# Patient Record
Sex: FEMALE | Race: ASIAN | NOT HISPANIC OR LATINO | Employment: STUDENT | URBAN - METROPOLITAN AREA
[De-identification: names, ages, dates, MRNs, and addresses within clinical notes are randomized per-mention and may not be internally consistent; named-entity substitution may affect disease eponyms.]

---

## 2019-12-28 ENCOUNTER — APPOINTMENT (OUTPATIENT)
Dept: CT IMAGING | Facility: CLINIC | Age: 29
End: 2019-12-28
Attending: EMERGENCY MEDICINE
Payer: COMMERCIAL

## 2019-12-28 ENCOUNTER — HOSPITAL ENCOUNTER (EMERGENCY)
Facility: CLINIC | Age: 29
Discharge: HOME | End: 2019-12-29
Attending: EMERGENCY MEDICINE
Payer: COMMERCIAL

## 2019-12-28 ENCOUNTER — APPOINTMENT (OUTPATIENT)
Dept: GENERAL RADIOLOGY | Facility: CLINIC | Age: 29
End: 2019-12-28
Attending: EMERGENCY MEDICINE
Payer: COMMERCIAL

## 2019-12-28 ENCOUNTER — HOSPITAL ENCOUNTER (EMERGENCY)
Facility: CLINIC | Age: 29
Discharge: HOME | End: 2019-12-28
Attending: EMERGENCY MEDICINE | Admitting: EMERGENCY MEDICINE
Payer: COMMERCIAL

## 2019-12-28 VITALS
DIASTOLIC BLOOD PRESSURE: 77 MMHG | HEART RATE: 73 BPM | HEIGHT: 64 IN | OXYGEN SATURATION: 100 % | BODY MASS INDEX: 21.34 KG/M2 | SYSTOLIC BLOOD PRESSURE: 121 MMHG | RESPIRATION RATE: 20 BRPM | WEIGHT: 125 LBS | TEMPERATURE: 97.8 F

## 2019-12-28 VITALS
HEIGHT: 64 IN | BODY MASS INDEX: 21.08 KG/M2 | OXYGEN SATURATION: 97 % | WEIGHT: 123.46 LBS | HEART RATE: 84 BPM | TEMPERATURE: 98.4 F | SYSTOLIC BLOOD PRESSURE: 106 MMHG | RESPIRATION RATE: 18 BRPM | DIASTOLIC BLOOD PRESSURE: 41 MMHG

## 2019-12-28 DIAGNOSIS — S72.434A CLOSED NONDISPLACED FRACTURE OF MEDIAL CONDYLE OF RIGHT FEMUR, INITIAL ENCOUNTER (H): Primary | ICD-10-CM

## 2019-12-28 DIAGNOSIS — M25.561 ACUTE PAIN OF RIGHT KNEE: Primary | ICD-10-CM

## 2019-12-28 PROCEDURE — 73700 CT LOWER EXTREMITY W/O DYE: CPT | Mod: RT

## 2019-12-28 PROCEDURE — 25000132 ZZH RX MED GY IP 250 OP 250 PS 637: Performed by: EMERGENCY MEDICINE

## 2019-12-28 PROCEDURE — 73560 X-RAY EXAM OF KNEE 1 OR 2: CPT | Mod: RT

## 2019-12-28 PROCEDURE — 73610 X-RAY EXAM OF ANKLE: CPT | Mod: RT

## 2019-12-28 PROCEDURE — 73590 X-RAY EXAM OF LOWER LEG: CPT | Mod: RT

## 2019-12-28 PROCEDURE — 99285 EMERGENCY DEPT VISIT HI MDM: CPT | Mod: 25

## 2019-12-28 PROCEDURE — 99284 EMERGENCY DEPT VISIT MOD MDM: CPT | Mod: Z6 | Performed by: EMERGENCY MEDICINE

## 2019-12-28 PROCEDURE — 99284 EMERGENCY DEPT VISIT MOD MDM: CPT | Mod: 25,27 | Performed by: EMERGENCY MEDICINE

## 2019-12-28 PROCEDURE — 25000128 H RX IP 250 OP 636: Performed by: EMERGENCY MEDICINE

## 2019-12-28 RX ORDER — HYDROCODONE BITARTRATE AND ACETAMINOPHEN 5; 325 MG/1; MG/1
1 TABLET ORAL EVERY 4 HOURS PRN
Qty: 12 TABLET | Refills: 0 | Status: ON HOLD | OUTPATIENT
Start: 2019-12-28 | End: 2020-01-28

## 2019-12-28 RX ORDER — IBUPROFEN 600 MG/1
600 TABLET, FILM COATED ORAL ONCE
Status: COMPLETED | OUTPATIENT
Start: 2019-12-28 | End: 2019-12-28

## 2019-12-28 RX ORDER — OXYCODONE HYDROCHLORIDE 5 MG/1
5 TABLET ORAL ONCE
Status: COMPLETED | OUTPATIENT
Start: 2019-12-28 | End: 2019-12-28

## 2019-12-28 RX ORDER — ONDANSETRON 4 MG/1
4 TABLET, ORALLY DISINTEGRATING ORAL ONCE
Status: COMPLETED | OUTPATIENT
Start: 2019-12-28 | End: 2019-12-28

## 2019-12-28 RX ADMIN — ONDANSETRON 4 MG: 4 TABLET, ORALLY DISINTEGRATING ORAL at 18:16

## 2019-12-28 RX ADMIN — OXYCODONE HYDROCHLORIDE 5 MG: 5 TABLET ORAL at 18:16

## 2019-12-28 RX ADMIN — IBUPROFEN 600 MG: 600 TABLET ORAL at 17:12

## 2019-12-28 ASSESSMENT — ENCOUNTER SYMPTOMS
ABDOMINAL PAIN: 0
SHORTNESS OF BREATH: 0
FEVER: 0
JOINT SWELLING: 1

## 2019-12-28 ASSESSMENT — MIFFLIN-ST. JEOR
SCORE: 1270
SCORE: 1277

## 2019-12-28 NOTE — ED TRIAGE NOTES
Presents with pain to RLE after falling off an incline while rock climbing within the past hour. Patient denies head injury, LOC. Patient able to move right foot, denies numbness/tingling.

## 2019-12-28 NOTE — ED AVS SNAPSHOT
[facility], [place], Emergency Department  [address] [place] [address]  Phone:  [phone]                                    Kamryn Segundo   MRN: [id number]    Department:  [facility], Emergency Department   Date of Visit:  12/28/2019           After Visit Summary Signature Page    I have received my discharge instructions, and my questions have been answered. I have discussed any challenges I see with this plan with the nurse or doctor.    ..........................................................................................................................................  Patient/Patient Representative Signature      ..........................................................................................................................................  Patient Representative Print Name and Relationship to Patient    ..................................................               ................................................  Date                                   Time    ..........................................................................................................................................  Reviewed by Signature/Title    ...................................................              ..............................................  Date                                               Time          22EPIC Rev 08/18

## 2019-12-28 NOTE — ED AVS SNAPSHOT
[facility], [place], Emergency Department  [address] [place] [address]  Phone:  [phone]                                    Kamryn Segundo   MRN: [id number]    Department:  [facility], Emergency Department   Date of Visit:  12/28/2019           After Visit Summary Signature Page    I have received my discharge instructions, and my questions have been answered. I have discussed any challenges I see with this plan with the nurse or doctor.    ..........................................................................................................................................  Patient/Patient Representative Signature      ..........................................................................................................................................  Patient Representative Print Name and Relationship to Patient    ..................................................               ................................................  Date                                   Time    ..........................................................................................................................................  Reviewed by Signature/Title    ...................................................              ..............................................  Date                                               Time          22EPIC Rev 08/18

## 2019-12-29 NOTE — DISCHARGE INSTRUCTIONS
A referral for orthopedic surgery has been made for you.  They will contact you to set up appointment.  Do not bear weight on your right leg and use crutches at all times    Please make an appointment to follow up with Orthopedics (phone: [phone]) as soon as possible.      Return to the ED if you develop worsening pain, worsening swelling, numbness, tingling, weakness, or any new or worsening concerns.

## 2019-12-29 NOTE — ED PROVIDER NOTES
"  History     Chief Complaint   Patient presents with     Knee Injury     HPI  Kamryn Segundo is a 29 year old female who presents with a chief complaint of knee pain and concern for occult fracture previously missed.  She was previously seen by myself just a few hours ago.  Initial preliminary x-ray read showed no evidence of fracture and I did not visualize 1 myself.  Final read raise the possible concern of a subtle fracture recommending further imaging.  Patient was contacted by myself and recommended either to follow-up with Orth or come in.  She elected to come in for a additional scan.  She notes pain has been well controlled with oxycodone.  She has been wearing the knee immobilizer since discharge.    History reviewed. No pertinent past medical history.    History reviewed. No pertinent surgical history.    No family history on file.    Social History     Tobacco Use     Smoking status: Never Smoker     Smokeless tobacco: Never Used   Substance Use Topics     Alcohol use: Not Currently           I have reviewed the Medications, Allergies, Past Medical and Surgical History, and Social History in the Epic system.    Review of Systems   Constitutional: Negative for fever.   Respiratory: Negative for shortness of breath.    Cardiovascular: Negative for chest pain.   Gastrointestinal: Negative for abdominal pain.   Musculoskeletal: Positive for joint swelling.   All other systems reviewed and are negative.      Physical Exam   BP: 106/41  Pulse: 84  Temp: 98.4  F (36.9  C)  Resp: 18  Height: 162.6 cm (5' 4\")  Weight: 56 kg (123 lb 7.3 oz)  SpO2: 97 %      Physical Exam  Vitals signs and nursing note reviewed.   Constitutional:       General: She is not in acute distress.     Appearance: She is well-developed.   HENT:      Head: Normocephalic.   Neck:      Musculoskeletal: Neck supple.   Pulmonary:      Effort: No respiratory distress.   Abdominal:      General: There is no distension.   Musculoskeletal:         " General: No deformity.      Comments: Right leg knee immobilizer in place, minimal tenderness   Skin:     General: Skin is dry.   Neurological:      Mental Status: She is alert.         ED Course        Procedures         CT Knee Right w/o Contrast   Final Result   IMPRESSION:    Comminuted and impacted fracture of the medial femoral condyle.                 Labs Ordered and Resulted from Time of ED Arrival Up to the Time of Departure from the ED - No data to display         Assessments & Plan (with Medical Decision Making)   29-year-old female presents to us with a chief complaint of knee pain.  CT scan was performed to better characterize extent of the injury.  A femoral medial condyle fracture was seen.  Ortho evaluated the patient and recommended crutches nonweightbearing knee immobilizer and follow-up in clinic.  He stated this appeared to be nonoperative.  There was a tentative plan to get an MRI.  Patient was signed out to overnight provider pending decision on MRI.    I have reviewed the nursing notes.    I have reviewed the findings, diagnosis, plan and need for follow up with the patient.    Discharge Medication List as of 12/29/2019  2:36 AM          Final diagnoses:   Closed nondisplaced fracture of medial condyle of right femur, initial encounter (H)       12/28/2019   [facility], [place], EMERGENCY DEPARTMENT     Alfred Pickett, DO  12/29/19 1749

## 2019-12-29 NOTE — CONSULTS
"[facility]  Orthopedics  2019 1:57 AM     Name: Kamryn Segundo  MRN: [id number]  Age: 29 year old  : 1990  Referring provider: Alfred Pickett*     Chief Complaint:  Right distal femur fracture     Date of Injury:  2019     History of Present Illness:   Kamryn Segundo is a 29 year old female with no significant PMH who presents with a right distal femur fracture after falling from a height of 1 meter at a bouldering gym.  Patient states that she twisted her knee during the fall and directly impacted the edge of a mat.  She experienced immediate pain and was unable to weight-bear.  She called her friend who brought her to the emergency department.  She has no other pain.  She denies numbness, tingling, weakness.  She had no antecedent knee pain and has no history of prior knee injury.  X-ray and CT scan of the right knee demonstrated a right distal femur fracture for which orthopedic surgery was consulted for evaluation and management.     Review of Systems:   A 10-point review of systems was obtained and is negative except for as noted in the HPI.      Medications:   Pain: None  Anticoagulation: None  Antibiotics: None     Allergies:  No Known Allergies     Past Medical History:  No pertinent past medical history.     Past Surgical History:  No pertinent surgical history.     Social History:  Does not smoke, use alcohol, or use other drugs.  Enjoys recreational sports including blistering.     Family History:  No past pertinent family history. No bleeding, clotting, or adverse reactions to anesthesia.     Physical Examination:  /41   Pulse 84   Temp 98.4  F (36.9  C) (Oral)   Resp 18   Ht 1.626 m (5' 4\")   Wt 56 kg (123 lb 7.3 oz)   LMP 2019   SpO2 97%   BMI 21.19 kg/m    General: alert and oriented, no acute distress, cooperative with exam  Neurological: A&Ox3, CN II-XII grossly intact  HEENT: normocephalic, atraumatic, EOMI, no scleral icterus  Respiratory: breathing " non-labored, no wheezing, no stridor  Cardiovascular: nontachycardic  Skin: no rashes or lesions    Musculoskeletal:  RUE: No gross deformity. Skin intact. Full active/passive ROM w/o pain or crepitus. Fires deltoid, biceps, triceps, wrist extension/flexion, , EPL, intrinsics, FPL with 5/5 strength. SILT in axillary, musculocutaneous, radial, ulnar, and median nerve distributions. Radial pulse 2+ and fingers  with BCR.    LUE: No gross deformity. Skin intact. Full active/passive ROM w/o pain or crepitus. Fires deltoid, biceps, triceps, wrist extension/flexion, , EPL, intrinsics, FPL with 5/5 strength. SILT in axillary, musculocutaneous, radial, ulnar, and median nerve distributions. Radial pulse 2+ and fingers  with BCR.    RLE: Marked swelling over an effusion in knee.  Mild ecchymosis over the medial femoral condyle.  Skin intact.  Tenderness to palpation over the medial joint line.  Grossly stable with varus and valgus stress.  Lockman test with no firm endpoint but difficult to assess due to effusion.  Fires tibialis anterior, gastrocsoleus, EHL, FHL, and lesser toes.  Sensation intact in the femoral, sural, saphenous, deep peroneal, superficial peroneal, tibial nerve distributions.  Dorsalis pedis pulse palpable and foot warm well-perfused with brisk capillary refill.    LLE: No gross deformity. Skin intact. Full active/passive ROM of all joints w/o pain or crepitus. 5/5 straight leg raise. Fires TA/Gastroc-Soleus/EHL/FHL with 5/5 strength. SILT in femoral, sural, saphenous, deep peroneal, superficial peroneal, and tibial nerve distributions. Dorsalis pedis and posterior tibial arteries 2+ and foot wwp with BCR.     Imaging:   X-ray of the right knee AP and lateral demonstrates some cortical irregularity in the distal medial femoral condyle.  No dislocation.    CT of the right knee demonstrates approximately 2 to 3 mm of subchondral bone depression in the medial femoral condyle.  There is also a bony  avulsion over the tibial spine.     Assessment:   Kamryn Segundo is a 29 year old otherwise healthy female with no significant PMH who presents with closed right distal medial femoral condyle depression fracture with possible avulsion injury of the cruciate ligaments.     Plan:   - Nonweightbearing right lower extremity with crutches  - Knee immobilizer while out of bed  - Pain control per ED staff  - [facility] within 1 week with possible further imaging needed at that time  - Educated on red flag symptoms including though suggestive of compartment syndrome     Patient seen and will be discussed with Dr. Barraza.     Angel Caraballo MD, PhD  Orthopedic Surgery Resident  [facility]

## 2019-12-29 NOTE — ED TRIAGE NOTES
Pt arrived to the ER r/t right knee injury that occurred earlier today and was seen here and discharged. Pt was called by the MD to come back to the ER regarding the xray result. VSS and afebrile. Has the knee immobilizer in place on arrival.

## 2019-12-29 NOTE — ED PROVIDER NOTES
"    [facility] EMERGENCY DE[facility] ([facility])  12/28/19    History     Chief Complaint   Patient presents with     Fall     The history is provided by the patient and a friend.     Kamryn Segundo is a 29 year old otherwise healthy female who is presenting to the Emergency Department after falling from a climbing wall today. Patient states she was bouldering with no harness and fell from about 1 meter. She states she landed weird and now is having pain in her right lower extremity from her knee down to her ankle. Patient denies hitting her head. Patient took ibuprofen about 30-40 minutes ago.    I have reviewed the Medications, Allergies, Past Medical and Surgical History, and Social History in the Epic system.    History reviewed. No pertinent past medical history.    History reviewed. No pertinent surgical history.    History reviewed. No pertinent family history.    Social History     Tobacco Use     Smoking status: Never Smoker     Smokeless tobacco: Never Used   Substance Use Topics     Alcohol use: Not Currently       No current facility-administered medications for this encounter.      Current Outpatient Medications   Medication     HYDROcodone-acetaminophen (NORCO) 5-325 MG tablet      No Known Allergies      Review of Systems   Musculoskeletal:        Positive for pain from right knee to ankle   All other systems reviewed and are negative.      Physical Exam   BP: 115/71  Pulse: 73  Heart Rate: 69  Temp: 97.8  F (36.6  C)  Resp: 17  Height: 162.6 cm (5' 4\")  Weight: 56.7 kg (125 lb)  SpO2: 98 %      Physical Exam  Vitals signs and nursing note reviewed.   Constitutional:       General: She is not in acute distress.     Appearance: She is well-developed.   HENT:      Head: Normocephalic.   Neck:      Musculoskeletal: Neck supple.   Pulmonary:      Effort: No respiratory distress.   Abdominal:      General: There is no distension.   Musculoskeletal:         General: No deformity.      Comments: Right " knee swollen, minimal tenderness, increased pain on range of motion   Skin:     General: Skin is dry.   Neurological:      Mental Status: She is alert.         ED Course   6:02 PM  The patient was seen and examined by Alfred Pickett DO in Room ED22.        Procedures   Results for orders placed or performed during the hospital encounter of 12/28/19 (from the past 24 hour(s))   XR Knee Right 1/2 Views    Narrative    EXAM: XR KNEE RT 1 /2 VW  LOCATION: [facility]  DATE/TIME: 12/28/2019 6:36 PM    INDICATION: Right knee pain after a fall from climbing wall.  COMPARISON: None.      Impression    IMPRESSION:   1.  Large knee joint effusion.  2.  Deformity of the anterior medial femoral condyle, with overlying curvilinear lucency visible on the frontal projection, concerning for non-displaced impaction fracture.   3.  There is also a tiny curvilinear lucency in the region of the tibial spine. Findings might represent sequela of cruciate ligament injury.   4.  Consider MRI or CT for more complete characterization of bony abnormalities and the ligaments.   XR Ankle Right G/E 3 Views    Narrative    EXAM: XR ANKLE RT G/E 3 VW  LOCATION: [facility]  DATE/TIME: 12/28/2019 6:36 PM    INDICATION: Right lower extremity pain after a fall from a clonic wall.  COMPARISON: Right knee and tibia/fibular radiographs, same date.      Impression    IMPRESSION:   1.  Moderate soft tissue swelling about the lateral aspect of the ankle.  2.  No fracture or joint malalignment.   XR Tibia & Fibula Right 2 Views    Narrative    EXAM: XR TIBIA and FIBULA RT 2 VW  LOCATION: [facility]  DATE/TIME: 12/28/2019 6:36 PM    INDICATION: Right lower extremity pain after a fall from a climbing wall.  COMPARISON: Knee and ankle radiographs, same date.      Impression    IMPRESSION:   1.  Right tibia and fibula negative for fracture.  2.  The tibiofibular, knee, and ankle joints are normally aligned.  3.   Curvilinear lucency overlying the medial femoral condyle, also visible on the knee exam, concerning for nondisplaced impaction fracture.  4.  Moderate soft tissue swelling along the lateral aspect of the ankle.               Assessments & Plan (with Medical Decision Making)   29-year-old female presents to us with a chief complaint of knee pain after a fall from approximately 1 meter.  Suspected ligamentous injury based on exam.  Preliminary x-ray read was negative for fracture.  Final read did show a fracture after the patient had been discharged.  I did contact the patient and she plans to return for further imaging and determination if this is a true fracture.    I have reviewed the nursing notes.    I have reviewed the findings, diagnosis, plan and need for follow up with the patient.    Discharge Medication List as of 12/28/2019  8:08 PM      START taking these medications    Details   HYDROcodone-acetaminophen (NORCO) 5-325 MG tablet Take 1 tablet by mouth every 4 hours as needed for pain, Disp-12 tablet, R-0, Local Print             Final diagnoses:   Acute pain of right knee     I, Mireille Zimmerman, am serving as a trained medical scribe to document services personally performed by  Alfred Pickett DO, based on the provider's statements to me.      IAlfred DO, was physically present and have reviewed and verified the accuracy of this note documented by Mireille Zimmerman.     12/28/2019   [facility], [place], EMERGENCY DEPARTMENT     Alfred Pickett DO  12/28/19 5928

## 2019-12-29 NOTE — ED NOTES
I took care of pt when she was here a few hours ago and I discharged her.  She reports that her pain is in good control still from the oral pain medication that I gave to her.  MRI is pending.

## 2019-12-30 ENCOUNTER — TELEPHONE (OUTPATIENT)
Dept: NURSING | Facility: CLINIC | Age: 29
End: 2019-12-30

## 2019-12-30 NOTE — TELEPHONE ENCOUNTER
.[facility]: Nurse Triage Note  SITUATION/BACKGROUND                                                      Kamryn Segundo is a 29 year old female seen in ED on 12/28/19 for right knee injury calls to report swelling on the right knee.    Reviewed Discharge instructions from ED visit on 12/28/19.   Patient is following instructions as directed. Wearing brace and utilizing crutches.   However, patient has not elevated the knee or applied ice. Right knee/ leg is normal in color and temperature. Denies any tingling or change in skin integrity.    Patient encouraged to keep scheduled appointment in Orthopedics on 1/2/20.   Patient will monitor and call back with any concerns. No pain at rest. Slight pain noted with movement.     Routed FYI to Orthopedics for review and follow up.     RECOMMENDED DISPOSITION:  Home care instructions given per Discharge Instructions.  Will comply with recommendation: Yes    If further questions/concerns or if symptoms do not improve, worsen or new symptoms develop, call your PCP or [phone] to talk with the Resident on call, as soon as possible.    Guideline used: knee pain/ swelling/ AVS from ED 12/28/19.  Telephone Triage Protocols for Nurses, Fifth Edition, Marion Fajardo, RN, RN

## 2019-12-30 NOTE — TELEPHONE ENCOUNTER
DIAGNOSIS: Closed nondisplaced fracture of medial condyle of right femur   APPOINTMENT DATE: 1/8   NOTES STATUS DETAILS   OFFICE NOTE from referring provider N/A    OFFICE NOTE from other specialist rachel angel luis   DISCHARGE SUMMARY from hospital N/A    DISCHARGE REPORT from the ER Internal 12/28/19   OPERATIVE REPORT N/A    MEDICATION LIST Internal    MRI internal scheduled for 1/3/20   CT SCAN Internal 12/28   XRAYS (IMAGES & REPORTS) Internal 12/28

## 2020-01-02 ENCOUNTER — OFFICE VISIT (OUTPATIENT)
Dept: ORTHOPEDICS | Facility: CLINIC | Age: 30
End: 2020-01-02
Payer: COMMERCIAL

## 2020-01-02 ENCOUNTER — PRE VISIT (OUTPATIENT)
Dept: ORTHOPEDICS | Facility: CLINIC | Age: 30
End: 2020-01-02

## 2020-01-02 VITALS — WEIGHT: 123 LBS | BODY MASS INDEX: 21 KG/M2 | HEIGHT: 64 IN

## 2020-01-02 DIAGNOSIS — S72.401A CLOSED FRACTURE OF DISTAL END OF RIGHT FEMUR, UNSPECIFIED FRACTURE MORPHOLOGY, INITIAL ENCOUNTER (H): Primary | ICD-10-CM

## 2020-01-02 DIAGNOSIS — M25.461 KNEE EFFUSION, RIGHT: ICD-10-CM

## 2020-01-02 ASSESSMENT — PAIN SCALES - GENERAL: PAINLEVEL: NO PAIN (0)

## 2020-01-02 ASSESSMENT — MIFFLIN-ST. JEOR: SCORE: 1267.92

## 2020-01-02 NOTE — PROGRESS NOTES
"Sports Medicine Clinic Visit    PCP: Juany Saunders Sanya is a 29 year old female who is seen  as self referral presenting with right knee pain.     Injury: 12/28/2019-  Fall approx. 1.6 m from climbing wall and twisted right knee     Location of Pain: right posterior knee  Duration of Pain: 5 day(s)  Rating of Pain: 4/10 when painful, a \"stretch\" pain  Pain is better with: Immobilizer, crutches, ibuprofen, ice  Pain is worse with: Elevation  Additional Features: Swelling  Treatment so far consists of: Immobilizer, crutches, ibuprofen, ice  Prior History of related problems: None    Ht 1.626 m (5' 4\")   Wt 55.8 kg (123 lb)   LMP 12/16/2019   BMI 21.11 kg/m           PMH:  [place] tooth extraction, no general anesthesia  No other surgeries or chronic medical problems    Active problem list:  There is no problem list on file for this patient.        FH:  Negative, per pt.    SH:  Social History     Socioeconomic History     Marital status: Single     Spouse name: Not on file     Number of children: Not on file     Years of education: Not on file     Highest education level: Not on file   Occupational History     Not on file   Social Needs     Financial resource strain: Not on file     Food insecurity:     Worry: Not on file     Inability: Not on file     Transportation needs:     Medical: Not on file     Non-medical: Not on file   Tobacco Use     Smoking status: Never Smoker     Smokeless tobacco: Never Used   Substance and Sexual Activity     Alcohol use: Not Currently     Drug use: Never     Sexual activity: Not on file   Lifestyle     Physical activity:     Days per week: Not on file     Minutes per session: Not on file     Stress: Not on file   Relationships     Social connections:     Talks on phone: Not on file     Gets together: Not on file     Attends Orthodoxy service: Not on file     Active member of club or organization: Not on file     Attends meetings of clubs or organizations: Not on " file     Relationship status: Not on file     Intimate partner violence:     Fear of current or ex partner: Not on file     Emotionally abused: Not on file     Physically abused: Not on file     Forced sexual activity: Not on file   Other Topics Concern     Not on file   Social History Narrative     Not on file       MEDS:  See EMR, reviewed  ALL:  See EMR, reviewed    REVIEW OF SYSTEMS:  CONSTITUTIONAL:NEGATIVE for fever, chills, change in weight  INTEGUMENTARY/SKIN: NEGATIVE for worrisome rashes, moles or lesions  EYES: NEGATIVE for vision changes or irritation  ENT/MOUTH: NEGATIVE for ear, mouth and throat problems  RESP:NEGATIVE for significant cough or SOB  BREAST: NEGATIVE for masses, tenderness or discharge  CV: NEGATIVE for chest pain, palpitations or peripheral edema  GI: NEGATIVE for nausea, abdominal pain, heartburn, or change in bowel habits  :NEGATIVE for frequency, dysuria, or hematuria  :NEGATIVE for frequency, dysuria, or hematuria  NEURO: NEGATIVE for weakness, dizziness or paresthesias  ENDOCRINE: NEGATIVE for temperature intolerance, skin/hair changes  HEME/ALLERGY/IMMUNE: NEGATIVE for bleeding problems  PSYCHIATRIC: NEGATIVE for changes in mood or affect        CT KNEE RIGHT W/O CONTRAST  12/28/2019 11:53 PM       HISTORY: Knee pain after fall.     TECHNIQUE: Multiplanar imaging of the right knee without intravenous contrast. Radiation dose for this scan was reduced using automated exposure control, adjustment of the mA and/or kV according to patient size, or iterative reconstruction technique.     COMPARISON:  X-ray 12/28/2019.     FINDINGS: There is a comminuted and impacted fracture of the medial aspect of the medial femoral condyle. There is a small ossific fragment cephalad to the tibial spines, possibly an age indeterminant avulsion type fracture from the tibial spines. No   other fracture or dislocation. There is a knee joint effusion with a fat fluid level. Edema in the subcutaneous  fat and soft tissues anterior and lateral to the knee.                                                                      IMPRESSION:   Comminuted and impacted fracture of the medial femoral condyle.      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  She fell about 4-5 feet.  She has a CT scan that shows a distal medial femoral condylar fracture with a step-off and also a tibial spine avulsion that suggests internal derangement.  She denies previous injuries to her knee.  She feels her pain is well controlled without additional pain medicines.  She has been using a knee immobilizer intermittently and has been using crutches and has been consistent with nonweightbearing.  She denies numbness or tingling in the extremity    Objective she has a large amount of bruising from the distal thigh, posterior knee and upper calf.  She has a moderate effusion involving the knee.  It is difficult for her to participate in an active straight leg raise although overall her strength does seem to be intact.  She seems nontender at the quadriceps attachment at the proximal patella.  Patellar apprehension signs are negative.  Her skin is intact with no signs of open wound.  She has no tenderness in the calf and she can dorsiflex and volar flex and elza the foot with normal strength.  She denies numbness and tingling in the lower extremity.  The distal extremity is warm and dry and dorsalis pedis pulses appear intact.  I cannot do an adequate Lockman's.  MCL and LCL stresses are tolerated without signs of obvious laxity.  Appropriate in conversation and affect.    Assessment left-sided distal femur fracture with step-off deformity.  Left-sided knee effusion with internal derangement    Plan: She has crutches and will be consistent with nonweightbearing.  An MRI of the knee is  pending and I will have her follow-up with Dr. Gannon to go over the results.  She has a knee immobilizer.  She can remove it at night for sleep.  She can remove it during the day at rest.  We went over calf pumping exercises and she can do some gentle flexion and extension at the knee as tolerated.  We talked about transitioning her to a brace with hinges but with her swelling and bruising I do not think she would tolerate this brace right now.  She feels comfortable with the knee immobilizer for times where she needs to be more active and should remove it when she is resting.  We went over her CT scan results in detail.  The MRI is pending.

## 2020-01-02 NOTE — LETTER
"  1/2/2020      RE: Kamryn Segundo  [address]  [facility] [address]       Sports Medicine Clinic Visit    PCP: Juany Saunders    Kamryn Segundo is a 29 year old female who is seen  as self referral presenting with right knee pain.     Injury: 12/28/2019-  Fall approx. 1.6 m from climbing wall and twisted right knee     Location of Pain: right posterior knee  Duration of Pain: 5 day(s)  Rating of Pain: 4/10 when painful, a \"stretch\" pain  Pain is better with: Immobilizer, crutches, ibuprofen, ice  Pain is worse with: Elevation  Additional Features: Swelling  Treatment so far consists of: Immobilizer, crutches, ibuprofen, ice  Prior History of related problems: None    Ht 1.626 m (5' 4\")   Wt 55.8 kg (123 lb)   LMP 12/16/2019   BMI 21.11 kg/m            PMH:  [place] tooth extraction, no general anesthesia  No other surgeries or chronic medical problems    Active problem list:  There is no problem list on file for this patient.        FH:  Negative, per pt.    SH:  Social History     Socioeconomic History     Marital status: Single     Spouse name: Not on file     Number of children: Not on file     Years of education: Not on file     Highest education level: Not on file   Occupational History     Not on file   Social Needs     Financial resource strain: Not on file     Food insecurity:     Worry: Not on file     Inability: Not on file     Transportation needs:     Medical: Not on file     Non-medical: Not on file   Tobacco Use     Smoking status: Never Smoker     Smokeless tobacco: Never Used   Substance and Sexual Activity     Alcohol use: Not Currently     Drug use: Never     Sexual activity: Not on file   Lifestyle     Physical activity:     Days per week: Not on file     Minutes per session: Not on file     Stress: Not on file   Relationships     Social connections:     Talks on phone: Not on file     Gets together: Not on file     Attends Gnosticism service: Not on file     Active member of " club or organization: Not on file     Attends meetings of clubs or organizations: Not on file     Relationship status: Not on file     Intimate partner violence:     Fear of current or ex partner: Not on file     Emotionally abused: Not on file     Physically abused: Not on file     Forced sexual activity: Not on file   Other Topics Concern     Not on file   Social History Narrative     Not on file       MEDS:  See EMR, reviewed  ALL:  See EMR, reviewed    REVIEW OF SYSTEMS:  CONSTITUTIONAL:NEGATIVE for fever, chills, change in weight  INTEGUMENTARY/SKIN: NEGATIVE for worrisome rashes, moles or lesions  EYES: NEGATIVE for vision changes or irritation  ENT/MOUTH: NEGATIVE for ear, mouth and throat problems  RESP:NEGATIVE for significant cough or SOB  BREAST: NEGATIVE for masses, tenderness or discharge  CV: NEGATIVE for chest pain, palpitations or peripheral edema  GI: NEGATIVE for nausea, abdominal pain, heartburn, or change in bowel habits  :NEGATIVE for frequency, dysuria, or hematuria  :NEGATIVE for frequency, dysuria, or hematuria  NEURO: NEGATIVE for weakness, dizziness or paresthesias  ENDOCRINE: NEGATIVE for temperature intolerance, skin/hair changes  HEME/ALLERGY/IMMUNE: NEGATIVE for bleeding problems  PSYCHIATRIC: NEGATIVE for changes in mood or affect        CT KNEE RIGHT W/O CONTRAST  12/28/2019 11:53 PM       HISTORY: Knee pain after fall.     TECHNIQUE: Multiplanar imaging of the right knee without intravenous contrast. Radiation dose for this scan was reduced using automated exposure control, adjustment of the mA and/or kV according to patient size, or iterative reconstruction technique.     COMPARISON:  X-ray 12/28/2019.     FINDINGS: There is a comminuted and impacted fracture of the medial aspect of the medial femoral condyle. There is a small ossific fragment cephalad to the tibial spines, possibly an age indeterminant avulsion type fracture from the tibial spines. No   other fracture or  dislocation. There is a knee joint effusion with a fat fluid level. Edema in the subcutaneous fat and soft tissues anterior and lateral to the knee.                                                                      IMPRESSION:   Comminuted and impacted fracture of the medial femoral condyle.      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  She fell about 4-5 feet.  She has a CT scan that shows a distal medial femoral condylar fracture with a step-off and also a tibial spine avulsion that suggests internal derangement.  She denies previous injuries to her knee.  She feels her pain is well controlled without additional pain medicines.  She has been using a knee immobilizer intermittently and has been using crutches and has been consistent with nonweightbearing.  She denies numbness or tingling in the extremity    Objective she has a large amount of bruising from the distal thigh, posterior knee and upper calf.  She has a moderate effusion involving the knee.  It is difficult for her to participate in an active straight leg raise although overall her strength does seem to be intact.  She seems nontender at the quadriceps attachment at the proximal patella.  Patellar apprehension signs are negative.  Her skin is intact with no signs of open wound.  She has no tenderness in the calf and she can dorsiflex and volar flex and elza the foot with normal strength.  She denies numbness and tingling in the lower extremity.  The distal extremity is warm and dry and dorsalis pedis pulses appear intact.  I cannot do an adequate Lockman's.  MCL and LCL stresses are tolerated without signs of obvious laxity.  Appropriate in conversation and affect.    Assessment left-sided distal femur fracture with step-off deformity.  Left-sided knee effusion with internal  derangement    Plan: She has crutches and will be consistent with nonweightbearing.  An MRI of the knee is pending and I will have her follow-up with Dr. Gannon to go over the results.  She has a knee immobilizer.  She can remove it at night for sleep.  She can remove it during the day at rest.  We went over calf pumping exercises and she can do some gentle flexion and extension at the knee as tolerated.  We talked about transitioning her to a brace with hinges but with her swelling and bruising I do not think she would tolerate this brace right now.  She feels comfortable with the knee immobilizer for times where she needs to be more active and should remove it when she is resting.  We went over her CT scan results in detail.  The MRI is pending.        Juan David Cisneros MD

## 2020-01-03 ENCOUNTER — ANCILLARY PROCEDURE (OUTPATIENT)
Dept: MRI IMAGING | Facility: CLINIC | Age: 30
End: 2020-01-03
Attending: FAMILY MEDICINE
Payer: COMMERCIAL

## 2020-01-03 DIAGNOSIS — S72.401A CLOSED FRACTURE OF DISTAL END OF RIGHT FEMUR, UNSPECIFIED FRACTURE MORPHOLOGY, INITIAL ENCOUNTER (H): ICD-10-CM

## 2020-01-06 ENCOUNTER — TELEPHONE (OUTPATIENT)
Dept: ORTHOPEDICS | Facility: CLINIC | Age: 30
End: 2020-01-06

## 2020-01-06 NOTE — TELEPHONE ENCOUNTER
Health Call Center    Phone Message    May a detailed message be left on voicemail: yes    Reason for Call: Pt calling to see what she can do to make her knee issues better until she sees Dr Silva. She is having pain and she doesn't want to make it worse. Or what she can do to make it better. She wants to know if she can use a warm pack or cold pack to the back of her knee? Please call to discuss.    Action Taken: Message routed to:  [facility] ([facility] Sports Medicine

## 2020-01-06 NOTE — TELEPHONE ENCOUNTER
Called to inform her that she should ice 20 minutes on and 40 minutes off. She can do ankle pumps as tolerated to get some blood flow. Also let her know she can take ibuprofen/tylenol for her pain.

## 2020-01-08 ENCOUNTER — PREP FOR PROCEDURE (OUTPATIENT)
Dept: ORTHOPEDICS | Facility: CLINIC | Age: 30
End: 2020-01-08

## 2020-01-08 ENCOUNTER — OFFICE VISIT (OUTPATIENT)
Dept: ORTHOPEDICS | Facility: CLINIC | Age: 30
End: 2020-01-08
Attending: FAMILY MEDICINE
Payer: COMMERCIAL

## 2020-01-08 VITALS — HEIGHT: 64 IN | BODY MASS INDEX: 21 KG/M2 | WEIGHT: 123 LBS

## 2020-01-08 DIAGNOSIS — G89.29 CHRONIC PAIN OF RIGHT KNEE: Primary | ICD-10-CM

## 2020-01-08 DIAGNOSIS — M25.561 CHRONIC PAIN OF RIGHT KNEE: Primary | ICD-10-CM

## 2020-01-08 ASSESSMENT — MIFFLIN-ST. JEOR: SCORE: 1267.92

## 2020-01-08 NOTE — NURSING NOTE
Teaching Flowsheet   Relevant Diagnosis: multi-ligament reconstruction surgery of the right nee  Teaching Topic: pre surgery instructions     Person(s) involved in teaching:   Patient     Motivation Level:  Asks Questions: Yes  Eager to Learn: Yes  Cooperative: Yes  Receptive (willing/able to accept information): Yes  Any cultural factors/Nondenominational beliefs that may influence understanding or compliance? NA  Comments: none     Patient demonstrates understanding of the following:  Reason for the appointment, diagnosis and treatment plan: Yes  Knowledge of proper use of medications and conditions for which they are ordered (with special attention to potential side effects or drug interactions): Yes  Which situations necessitate calling provider and whom to contact: Yes     Teaching Concerns Addressed:   Comments: pre surgical teaching and instructions     Proper use and care of crutches and brace (medical equip, care aids, etc.): Yes  Nutritional needs and diet plan: NA  Pain management techniques: Yes  Wound Care: Yes  How and/when to access community resources: NA     Instructional Materials Used/Given: surgery packet and verbal instructions given          none

## 2020-01-08 NOTE — NURSING NOTE
"Reason For Visit:   Chief Complaint   Patient presents with     Right Knee - Pain     Consult     Right knee       1. Extensive posterolateral corner injury with avulsion fracture at  proximal fibular tip.   a. Severe strain of popliteus with likely tearing of the popliteus  tendon at the myotendinous junction.    b. Complete tear of the fibular collateral ligament at the mid  substance.   c. Biceps femoris muscle strain with partial tearing of the tendon.   d. Tearing of the iliotibial band and lateral retinaculum in the  expected component of the patellotibial ligament allowing for  extension of joint effusion into the subcutaneous tissue.  Complete tear of the popliteofibular and fabellofibular ligaments.  2. Complete ACL tear with associated anterior translation.  3. Osteochondral impaction fracture of the medial femoral condyle with  area of overlying trochlear cartilage delamination.  4. Posterior cruciate ligament sprain.  5. Medial meniscus contusion.      ?  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  Currently working? No.  Work status?  Full time.  Date of injury: 12/28/19  Type of injury: acute- rock climbing.  Date of surgery: NA  Smoker: No  Request smoking cessation information: No    Current Outpatient Medications   Medication     HYDROcodone-acetaminophen (NORCO) 5-325 MG tablet     IBUPROFEN PO     No current facility-administered medications for this visit.       No Known Allergies    Ht 1.626 m (5' 4\")   Wt 55.8 kg (123 lb)   LMP 12/16/2019   BMI 21.11 kg/m      Kassandra Marie, ATC, ATC                                                      "

## 2020-01-08 NOTE — LETTER
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  Her peroneal nerve is been intact.  She was placed into a knee immobilizer and presents to my clinic today for discussion regarding potential surgical reconstructive options    PAST MEDICAL HISTORY: (Reviewed with the patient and in the EPIC medical record)  1. None    PAST SURGICAL HISTORY: (Reviewed with the patient and in the EPIC medical record)  1. None    MEDICATIONS: (Reviewed with the patient and in the EPIC medical record)    Notable medications include: None    ALLERGIES: (Reviewed with the patient and in the EPIC medical record)  1. None      SOCIAL HISTORY: (Reviewed with the patient and in the medical record)  --Tobacco: Non-smoker  --Occupation: Grad student [employer]  --Avocation/Sport: She enjoys rocklmbangbing and [id number]    FAMILY HISTORY: (Reviewed with the patient and in the medical record)  -- No family history of bleeding, clotting, or difficulty with anesthesia    REVIEW OF SYSTEMS: (Reviewed with the patient and on the health intake form)  -- A comprehensive 10 point review of systems was conducted and is negative except as noted in the HPI    EXAM:     General: Awake, Alert and Oriented, No acute Distress. Articulate and Interactive    Body mass index is 21.11 kg/m .    Right lower extremity :    Skin is Warm and Well perfused, no suggestion of infection    Resolving ecchymoses are present    Stability exam cannot be performed today secondary to pain guarding and swelling    Range of motion was very limited tend approximately 45 degrees    Peroneal nerve was working  normally with good strength    EHL/FHL/TA/GS 5/5    Sensation intact L3-S1    2+ Dorsalis Pedis Pulse    IMAGING:    Plain Radiographs: Plain radiographs show changes along the medial femoral condyle of the right knee consistent with an impaction fracture type injury.  This is confirmed on CT scan.    MRI: MRI shows a complete rupture of the anterior cruciate ligament as well as the fibular collateral ligament as well as the biceps femoris tendon.    ASSESSMENT:  1. Multi-ligament knee injury/knee dislocation equivalent  2. Grade 3 rupture of the anterior cruciate ligament  3. Grade 3 rupture the fibular collateral ligament  4. I think the popliteus tendon is intact.  There is certainly been a muscle belly injury to the popliteus tendon  5. Impaction injury to the medial femoral condyle  6. Biceps femoris avulsion  7. Intact peroneal nerve    PLAN:  1. I had a very long discussion with the patient regarding her knee injury.  This is certainly a devastating knee injury with life altering consequences.  2. I told her very clearly that it will never be the same as it was before.  3. It is my recommendation for surgical reconstruction including ACL reconstruction posterior lateral corner reconstruction we will plan for an allograft reconstruction of the posterior lateral corner and hamstring autograft reconstruction of the ACL.  4. We will need to assess the popliteus tendon and potentially reconstruct  5. We will plan to perform a biceps femoris repair  6. She will need a peroneal nerve neural lysis  7. We may need to evaluate the medial femoral condyle and repair this impaction fracture    I am going to enroll the patient in our prospective randomized multicenter study looking at the time of knee dislocation events.  She is voiced interest in proceeding with this.  Would like to get arranged.  Understands that she is under no obligation to participate      Rodrigo Silva MD

## 2020-01-08 NOTE — PROGRESS NOTES
CHIEF CONCERN: Right knee multi-ligament knee injury    HISTORY:   Very pleasant 29-year-old female she is a PhD grad student here to [facility] she sustained a devastating, life altering knee dislocation equivalent event with complete rupture of her ACL as well as a complete avulsion of her fibular collateral ligament.  Her peroneal nerve is been intact.  She was placed into a knee immobilizer and presents to my clinic today for discussion regarding potential surgical reconstructive options    PAST MEDICAL HISTORY: (Reviewed with the patient and in the EPIC medical record)  1. None    PAST SURGICAL HISTORY: (Reviewed with the patient and in the EPIC medical record)  1. None    MEDICATIONS: (Reviewed with the patient and in the EPIC medical record)    Notable medications include: None    ALLERGIES: (Reviewed with the patient and in the EPIC medical record)  1. None      SOCIAL HISTORY: (Reviewed with the patient and in the medical record)  --Tobacco: Non-smoker  --Occupation: Grad student [employer]  --Avocation/Sport: She enjoys rockclimbing and bouldering    FAMILY HISTORY: (Reviewed with the patient and in the medical record)  -- No family history of bleeding, clotting, or difficulty with anesthesia    REVIEW OF SYSTEMS: (Reviewed with the patient and on the health intake form)  -- A comprehensive 10 point review of systems was conducted and is negative except as noted in the HPI    EXAM:     General: Awake, Alert and Oriented, No acute Distress. Articulate and Interactive    Body mass index is 21.11 kg/m .    Right lower extremity :    Skin is Warm and Well perfused, no suggestion of infection    Resolving ecchymoses are present    Stability exam cannot be performed today secondary to pain guarding and swelling    Range of motion was very limited tend approximately 45 degrees    Peroneal nerve was working normally with good strength    EHL/FHL/TA/GS 5/5    Sensation intact L3-S1    2+  Dorsalis Pedis Pulse    IMAGING:    Plain Radiographs: Plain radiographs show changes along the medial femoral condyle of the right knee consistent with an impaction fracture type injury.  This is confirmed on CT scan.    MRI: MRI shows a complete rupture of the anterior cruciate ligament as well as the fibular collateral ligament as well as the biceps femoris tendon.    ASSESSMENT:  1. Multi-ligament knee injury/knee dislocation equivalent  2. Grade 3 rupture of the anterior cruciate ligament  3. Grade 3 rupture the fibular collateral ligament  4. I think the popliteus tendon is intact.  There is certainly been a muscle belly injury to the popliteus tendon  5. Impaction injury to the medial femoral condyle  6. Biceps femoris avulsion  7. Intact peroneal nerve    PLAN:  1. I had a very long discussion with the patient regarding her knee injury.  This is certainly a devastating knee injury with life altering consequences.  2. I told her very clearly that it will never be the same as it was before.  3. It is my recommendation for surgical reconstruction including ACL reconstruction posterior lateral corner reconstruction we will plan for an allograft reconstruction of the posterior lateral corner and hamstring autograft reconstruction of the ACL.  4. We will need to assess the popliteus tendon and potentially reconstruct  5. We will plan to perform a biceps femoris repair  6. She will need a peroneal nerve neural lysis  7. We may need to evaluate the medial femoral condyle and repair this impaction fracture    I am going to enroll the patient in our prospective randomized multicenter study looking at the time of knee dislocation events.  She is voiced interest in proceeding with this.  Would like to get arranged.  Understands that she is under no obligation to participate

## 2020-01-13 ENCOUNTER — APPOINTMENT (OUTPATIENT)
Dept: LAB | Facility: CLINIC | Age: 30
End: 2020-01-13
Payer: COMMERCIAL

## 2020-01-13 ENCOUNTER — TELEPHONE (OUTPATIENT)
Dept: ORTHOPEDICS | Facility: CLINIC | Age: 30
End: 2020-01-13

## 2020-01-13 ENCOUNTER — OFFICE VISIT (OUTPATIENT)
Dept: FAMILY MEDICINE | Facility: CLINIC | Age: 30
End: 2020-01-13
Payer: COMMERCIAL

## 2020-01-13 ENCOUNTER — THERAPY VISIT (OUTPATIENT)
Dept: PHYSICAL THERAPY | Facility: CLINIC | Age: 30
End: 2020-01-13
Payer: COMMERCIAL

## 2020-01-13 VITALS — HEART RATE: 74 BPM | SYSTOLIC BLOOD PRESSURE: 110 MMHG | OXYGEN SATURATION: 100 % | DIASTOLIC BLOOD PRESSURE: 72 MMHG

## 2020-01-13 DIAGNOSIS — G89.29 CHRONIC PAIN OF RIGHT KNEE: ICD-10-CM

## 2020-01-13 DIAGNOSIS — S89.90XA: ICD-10-CM

## 2020-01-13 DIAGNOSIS — M25.561 CHRONIC PAIN OF RIGHT KNEE: ICD-10-CM

## 2020-01-13 DIAGNOSIS — R60.9 EDEMA: ICD-10-CM

## 2020-01-13 DIAGNOSIS — S83.519A ACL (ANTERIOR CRUCIATE LIGAMENT) TEAR: Primary | ICD-10-CM

## 2020-01-13 DIAGNOSIS — Z01.818 PREOP GENERAL PHYSICAL EXAM: Primary | ICD-10-CM

## 2020-01-13 LAB
ANION GAP SERPL CALCULATED.3IONS-SCNC: 5 MMOL/L (ref 3–14)
BUN SERPL-MCNC: 9 MG/DL (ref 7–30)
CALCIUM SERPL-MCNC: 8.9 MG/DL (ref 8.5–10.1)
CHLORIDE SERPL-SCNC: 108 MMOL/L (ref 94–109)
CO2 SERPL-SCNC: 28 MMOL/L (ref 20–32)
CREAT SERPL-MCNC: 0.7 MG/DL (ref 0.52–1.04)
ERYTHROCYTE [DISTWIDTH] IN BLOOD BY AUTOMATED COUNT: 13 % (ref 10–15)
GFR SERPL CREATININE-BSD FRML MDRD: >90 ML/MIN/{1.73_M2}
GLUCOSE SERPL-MCNC: 86 MG/DL (ref 70–99)
HCT VFR BLD AUTO: 36.8 % (ref 35–47)
HGB BLD-MCNC: 11.8 G/DL (ref 11.7–15.7)
MCH RBC QN AUTO: 28.3 PG (ref 26.5–33)
MCHC RBC AUTO-ENTMCNC: 32.1 G/DL (ref 31.5–36.5)
MCV RBC AUTO: 88 FL (ref 78–100)
PLATELET # BLD AUTO: 132 10E9/L (ref 150–450)
POTASSIUM SERPL-SCNC: 3.5 MMOL/L (ref 3.4–5.3)
RBC # BLD AUTO: 4.17 10E12/L (ref 3.8–5.2)
SODIUM SERPL-SCNC: 141 MMOL/L (ref 133–144)
WBC # BLD AUTO: 3.7 10E9/L (ref 4–11)

## 2020-01-13 PROCEDURE — 97016 VASOPNEUMATIC DEVICE THERAPY: CPT | Mod: GP | Performed by: PHYSICAL THERAPIST

## 2020-01-13 PROCEDURE — 97110 THERAPEUTIC EXERCISES: CPT | Mod: GP | Performed by: PHYSICAL THERAPIST

## 2020-01-13 PROCEDURE — 97161 PT EVAL LOW COMPLEX 20 MIN: CPT | Mod: GP | Performed by: PHYSICAL THERAPIST

## 2020-01-13 PROCEDURE — 97530 THERAPEUTIC ACTIVITIES: CPT | Mod: GP | Performed by: PHYSICAL THERAPIST

## 2020-01-13 ASSESSMENT — ACTIVITIES OF DAILY LIVING (ADL)
KNEE_ACTIVITY_OF_DAILY_LIVING_SUM: 24
SIT WITH YOUR KNEE BENT: I AM UNABLE TO DO THE ACTIVITY
SQUAT: I AM UNABLE TO DO THE ACTIVITY
KNEE_ACTIVITY_OF_DAILY_LIVING_SCORE: 34.29
LIMPING: THE SYMPTOM AFFECTS MY ACTIVITY MODERATELY
AS_A_RESULT_OF_YOUR_KNEE_INJURY,_HOW_WOULD_YOU_RATE_YOUR_CURRENT_LEVEL_OF_DAILY_ACTIVITY?: SEVERELY ABNORMAL
WEAKNESS: THE SYMPTOM AFFECTS MY ACTIVITY SLIGHTLY
GO UP STAIRS: ACTIVITY IS FAIRLY DIFFICULT
RAW_SCORE: 24
STIFFNESS: THE SYMPTOM AFFECTS MY ACTIVITY SEVERELY
HOW_WOULD_YOU_RATE_THE_OVERALL_FUNCTION_OF_YOUR_KNEE_DURING_YOUR_USUAL_DAILY_ACTIVITIES?: SEVERELY ABNORMAL
GO DOWN STAIRS: ACTIVITY IS SOMEWHAT DIFFICULT
WALK: ACTIVITY IS SOMEWHAT DIFFICULT
KNEEL ON THE FRONT OF YOUR KNEE: I AM UNABLE TO DO THE ACTIVITY
GIVING WAY, BUCKLING OR SHIFTING OF KNEE: THE SYMPTOM PREVENTS ME FROM ALL DAILY ACTIVITIES
SWELLING: THE SYMPTOM AFFECTS MY ACTIVITY SLIGHTLY
HOW_WOULD_YOU_RATE_THE_CURRENT_FUNCTION_OF_YOUR_KNEE_DURING_YOUR_USUAL_DAILY_ACTIVITIES_ON_A_SCALE_FROM_0_TO_100_WITH_100_BEING_YOUR_LEVEL_OF_KNEE_FUNCTION_PRIOR_TO_YOUR_INJURY_AND_0_BEING_THE_INABILITY_TO_PERFORM_ANY_OF_YOUR_USUAL_DAILY_ACTIVITIES?: 5
PAIN: THE SYMPTOM AFFECTS MY ACTIVITY SEVERELY
RISE FROM A CHAIR: ACTIVITY IS SOMEWHAT DIFFICULT
STAND: ACTIVITY IS SOMEWHAT DIFFICULT

## 2020-01-13 ASSESSMENT — PAIN SCALES - GENERAL: PAINLEVEL: MILD PAIN (3)

## 2020-01-13 NOTE — TELEPHONE ENCOUNTER
Patient is scheduled for surgery with Dr. Silva    Spoke or left message with: Patient    Date of Surgery: 1/28/20    Location: [place]    Informed patient they will need an adult : Yes    Post op: 1 week    Pre-op with surgeon (if applicable): Complete    H&P: Scheduled with [facility] NP clinic 1/13/20    Additional imaging/appointments: N/A    Surgery packet: Received in clinic     Additional comments: N/A

## 2020-01-13 NOTE — PATIENT INSTRUCTIONS
[facility] at 1:00pm    Arrive at 10:30AM    1 week before surgery no more ibuprofen  Nothing to eat or drink the morning of surgery.     [facility] Test Result notification information:  *You will be notified with in 7-10 days of your appointment day regarding the results of your test.  If you are on MyChart you will be notified as soon as the provider has reviewed the results and signed off on them.    [facility]: [phone]

## 2020-01-13 NOTE — PROGRESS NOTES
"[facility]  [address]  [address]  [place] [id number] [phone]  Dept: [phone]    PRE-OP EVALUATION:  01/13/20    Kamryn Segundo is 29 year old female presents for pre-operative evaluation assessment as requested by Dr. CAROLINA Silva. She requires evaluation and anesthesia risk assessment prior to undergoing surgery/procedure for treatment of multi-ligament knee injury/knee dislocation event with summer 3 rupture of ACL, fibular collateral ligament, biceps femoris avulsion, and impaction fracture to the medial femoral condyle .    History of Present Illness   HPI related to upcoming procedure: fell from a climbing wall with [employer] on 12/28/19. Was bouldering without a harness and estimates fall was 1m and she \"landed weird\".     Kamryn reports a history of low platelet count in the past    Surgery Information     Preoperative Screening Questions:  Proposed Surgery/ Procedure: Examination under anesthesia right knee, right knee arthroscopy, ACL reconstruction with hamstring autograft, right posterior alteral cornor reconstrunction with allograft, biceps femoris repair, peroneal nerve neural lysis  Date of Surgery/ Procedure: 1/28/2020  Time of Surgery/ Procedure: 1300  Hospital/Surgical Facility: [facility]   Primary Physician: Juany Saunders  Type of Anesthesia Anticipated: General    Preoperative Screening Questions     Patient has a Health Care Directive or Living Will:  NO    1. NO - Do you have a history of heart attack, stroke, stent, bypass or surgery on an artery in the head, neck, heart or legs?  2. NO - Do you ever have any pain or discomfort in your chest?  3. NO - Do you have a history of  Heart Failure?  4. NO - Are you troubled by shortness of breath when: walking on the level, up a slight hill or at night?  5. NO - Do you currently have a cold, bronchitis or other respiratory infection?  6. NO - Do " you have a cough, shortness of breath or wheezing?  7. NO - Do you sometimes get pains in the calves of your legs when you walk?  8. NO - Do you or anyone in your family have previous history of blood clots?  9. NO - Do you or does anyone in your family have a serious bleeding problem such as prolonged bleeding following surgeries or cuts?  10. NO - Have you ever had problems with anemia or been told to take iron pills?  11. NO - Have you had any abnormal blood loss such as black, tarry or bloody stools, or abnormal vaginal bleeding?  12. NO - Have you ever had a blood transfusion?  13. NO - Have you or any of your relatives ever had problems with anesthesia?  14. NO - Do you have sleep apnea, excessive snoring or daytime drowsiness?  15. NO - Do you have any prosthetic heart valves?  16. NO - Do you have prosthetic joints?  17. NO - Is there any chance that you may be pregnant?       Audit C Alcohol Screening Tool  AUDIT   Questions 0 1 2 3 4 Score   1. How often do you have a drink  containing alcohol? Never Monthly or less 2 to 4  times a  month 2 to 3  times a  week 4 or more  times a  week 0   2. How many drinks containing alcohol  do you have on a typical day when you are drinking? 1 or 2 3 or 4 5 or 6 7 to 9 10 or more 0   3. How often do you have more than five  or more drinks on one occasion? Never Less  than  monthly Monthly Weekly Daily or  almost  daily 0   Scoring:   A score of 4 for adult men or 3 for adult women is an indication of hazardous drinking (risk for physical or physiological harm).   A score of 5 or more for adult men or 4 or more for adult women is an indication of an alcohol use disorder.      Medical History     Past Medical History:   Diagnosis Date     Pollen allergy        Past Surgical History:   Procedure Laterality Date     NO HISTORY OF SURGERY       wisdom teeth extraction             Social History     Tobacco Use     Smoking status: Never Smoker     Smokeless tobacco: Never  Used     Tobacco comment: does not vape    Substance Use Topics     Alcohol use: Not Currently     History   Drug Use Unknown       Current Outpatient Medications   Medication     HYDROcodone-acetaminophen (NORCO) 5-325 MG tablet     IBUPROFEN PO     No current facility-administered medications for this visit.        OTC products: none    No Known Allergies    Latex Allergy: NO      Review Of Systems   CONSTITUTIONAL: NEGATIVE for fever, chills, change in weight  INTEGUMENTARY/SKIN: NEGATIVE for worrisome rashes, moles or lesions  EYES: NEGATIVE for vision changes or irritation  ENT/MOUTH: NEGATIVE for ear, mouth and throat problems  RESP: NEGATIVE for significant cough or SOB  BREAST: NEGATIVE for masses, tenderness or discharge  CV: NEGATIVE for chest pain, palpitations or peripheral edema  GI: NEGATIVE for nausea, abdominal pain, heartburn, or change in bowel habits  : NEGATIVE for frequency, dysuria, or hematuria  MUSCULOSKELETAL: NEGATIVE for significant arthralgias or myalgia of BUE and LLE. POSITIVE for pain, swelling of right knee, inability to bear weight or flex knee. In immobilizer at time of exam, sitting in wheelchair.   NEURO: NEGATIVE for weakness, dizziness or paresthesias  ENDOCRINE: NEGATIVE for temperature intolerance, skin/hair changes  HEME: NEGATIVE for bleeding problems  PSYCHIATRIC: NEGATIVE for changes in mood or affect    Exam     Patient Vitals for the past 24 hrs:   BP Pulse SpO2 Height Weight   01/13/20 1320 110/72 74 100 % -- --     There is no height or weight on file to calculate BMI.    GENERAL APPEARANCE: healthy, alert and no distress     EYES: EOMI, PERRL     HENT: ear canals and TM's normal and nose and mouth without ulcers or lesions     NECK: no adenopathy, no asymmetry, masses, or scars and thyroid normal to palpation     RESP: lungs clear to auscultation - no rales, rhonchi or wheezes     CV: regular rates and rhythm, normal S1 S2, no S3 or S4 and no murmur, click or rub      ABDOMEN:  soft, nontender, no HSM or masses and bowel sounds normal     MS: extremities normal- no gross deformities noted, no evidence of inflammation in joints, FROM with BUE, LLE. RLE with effusion at knee, healing ecchymosis along shin and calf.      SKIN: no suspicious lesions or rashes     NEURO: Normal strength and tone, sensory exam grossly normal, mentation intact and speech normal     PSYCH: mentation appears normal. and affect normal/bright     LYMPHATICS: No cervical adenopathy    Diagnostics   CBC with platelets  BMP    No results for input(s): HGB, PLT, INR, NA, POTASSIUM, CR, A1C in the last 58018 hours.    Risk Assessment     Surgical Risk:  The proposed surgical procedure is considered INTERMEDIATE risk.    Revised Cardiac Risk Index  3.9% 30-day risk for sudden death, MI, or cardiac arrest  The patient has the following serious cardiovascular risks for perioperative complications such as (MI, PE, VFib and 3  AV Block):  No serious cardiac risks    INTERPRETATION: 0 risks: Class I (very low risk - 0.4% complication rate)    [facility] Activity Status Index  Total Points: 58.2  Total METs: 9.89    Functional capacity is classified as:  Excellent: >10 METs  Good: 7-10 METs   Moderate: 4 - 6 METs  Poor: <4 METs    The patient has the following additional risks for perioperative complications:  No identified additional risks    Impression     For above listed surgery and anesthesia:   Patient is at moderate risk for surgery/procedure and perioperative/procedure complications.    Recommendations       --Patient is on no chronic medications      ICD-10-CM    1. Preop general physical exam Z01.818        GENERAL PREOP INSTRUCTIONS:  Proceed with surgery as planned.  No food or liquids the morning of surgery.  Call surgeon if develops respiratory illness, fever, or other illness.  Advised to hold ibuprofen/Aleve/Aspirin one week prior to surgery    Patient is at low risk for surgery/procedure and  perioperative/procedure complications.    Signed Electronically by: OLGA Bender CNP    A copy of this evaluation report will be provided to requesting physician.   Please contact our office if there are any further questions or information required about this patient.    [facility] Preop Guidelines    ACC/AHA Guidelines    OLGA Bender CNP  January 13, 2020

## 2020-01-13 NOTE — NURSING NOTE
29 year old  Chief Complaint   Patient presents with     Pre-Op Exam     Pt is here for a pre op.       Blood pressure 110/72, pulse 74, last menstrual period 12/16/2019, SpO2 100 %. There is no height or weight on file to calculate BMI.  BP completed using cuff size:      Gayatri Reeves, JUSTIN  January 13, 2020 1:23 PM

## 2020-01-13 NOTE — PROGRESS NOTES
[facility] Initial Evaluation  Subjective:  [facility]                  Physical Therapy Initial Evaluation: Subjective History  Anticipated Date of Surgery:1/28/20.    Mechanism of Injury: was rockclimbing and suffered multiligament knee injury from twisting mechanism.  Proposed Surgical Procedure/Limb: ACL-R, biceps femoris, LCL-R  Surgeon Name: Dr. Silva  Average Daily Pain Levels: 0-1/10 (Location: anterior, medial, lateral; Quality: Aching/Throbbing)  Other Symptoms: swelling  Symptom Mgmt Strategies: immbolization, NWB  Prior orthopaedic history/procedures: none  Prior non-operative management: none  Next MD Appt Date: n/a    Functional limitations since injury: unable to walk  Previous level of function: rock climbing, running, step mill      Patient Employment: PhD candidate - [employer]  Typical Physical Activities: see above    Pre-Operative Physical Therapy Examination    Physical Mobility Status  Gait: NWB, crutches, immobilizer  Transfers:  Assist of one onto table    Anthropometric Measures     Right Left Difference   Joint ROM      Hyperextension 0 deg 7 deg 7 deg   Extension 10 deg 0 deg 10 deg   Flexion 76 deg 144 deg 68 deg   Circumferential Measures      Joint Line 36 cm 35 cm 1 cm   15 cm Prox 45 cm 48 cm 3 cm   Effusion 2+ 0        Quadriceps Muscle Activation Right Left   Isometric Quad Activation Fair Normal   Straight Leg Raising Unable to perform No extensor lag     System    Physical Exam    General     ROS    Assessment/Plan:    Patient is a 29 year old female with right side knee complaints.    Patient has the following significant findings with corresponding treatment plan.                Diagnosis 1:  ACL tear, PLC injury    Pain -  hot/cold therapy, US, electric stimulation, manual therapy, splint/taping/bracing/orthotics, self management, education and home program  Decreased ROM/flexibility - manual therapy and therapeutic exercise  Decreased joint  mobility - manual therapy and therapeutic exercise  Decreased strength - therapeutic exercise and therapeutic activities  Impaired balance - neuro re-education and therapeutic activities  Decreased proprioception - neuro re-education and therapeutic activities  Inflammation - cold therapy  Edema - vasopneumatics  Impaired gait - gait training  Impaired muscle performance - neuro re-education  Decreased function - therapeutic activities    Therapy Evaluation Codes:   1) History comprised of:   Personal factors that impact the plan of care:      None.    Comorbidity factors that impact the plan of care are:      None.     Medications impacting care: None.  2) Examination of Body Systems comprised of:   Body structures and functions that impact the plan of care:      Knee.   Activity limitations that impact the plan of care are:      Bathing, Bending, Driving, Dressing, Sitting, Sports, Squatting/kneeling, Stairs, Standing and Walking.  3) Clinical presentation characteristics are:   Stable/Uncomplicated.  4) Decision-Making    Low complexity using standardized patient assessment instrument and/or measureable assessment of functional outcome.  Cumulative Therapy Evaluation is: Low complexity.    Previous and current functional limitations:  (See Goal Flow Sheet for this information)    Short term and Long term goals: (See Goal Flow Sheet for this information)     Communication ability:  Patient appears to be able to clearly communicate and understand verbal and written communication and follow directions correctly.  Treatment Explanation - The following has been discussed with the patient:   RX ordered/plan of care  Anticipated outcomes  Possible risks and side effects  This patient would benefit from PT intervention to resume normal activities.   Rehab potential is good.    Frequency:  2 X week, once daily  Duration:  for 2 weeks  Discharge Plan:  Achieve all LTG.  Independent in home treatment program.  Reach maximal  therapeutic benefit.    Please refer to the daily flowsheet for treatment today, total treatment time and time spent performing 1:1 timed codes.

## 2020-01-15 ENCOUNTER — THERAPY VISIT (OUTPATIENT)
Dept: PHYSICAL THERAPY | Facility: CLINIC | Age: 30
End: 2020-01-15
Payer: COMMERCIAL

## 2020-01-15 DIAGNOSIS — S83.519A ACL (ANTERIOR CRUCIATE LIGAMENT) TEAR: ICD-10-CM

## 2020-01-15 DIAGNOSIS — S89.90XA: ICD-10-CM

## 2020-01-15 DIAGNOSIS — R60.9 EDEMA: ICD-10-CM

## 2020-01-15 PROCEDURE — 97016 VASOPNEUMATIC DEVICE THERAPY: CPT | Mod: GP | Performed by: PHYSICAL THERAPY ASSISTANT

## 2020-01-15 PROCEDURE — 97140 MANUAL THERAPY 1/> REGIONS: CPT | Mod: GP | Performed by: PHYSICAL THERAPY ASSISTANT

## 2020-01-15 PROCEDURE — 97110 THERAPEUTIC EXERCISES: CPT | Mod: GP | Performed by: PHYSICAL THERAPY ASSISTANT

## 2020-01-21 ENCOUNTER — THERAPY VISIT (OUTPATIENT)
Dept: PHYSICAL THERAPY | Facility: CLINIC | Age: 30
End: 2020-01-21
Payer: COMMERCIAL

## 2020-01-21 DIAGNOSIS — S89.90XA: ICD-10-CM

## 2020-01-21 DIAGNOSIS — S83.519A ACL (ANTERIOR CRUCIATE LIGAMENT) TEAR: ICD-10-CM

## 2020-01-21 DIAGNOSIS — R60.9 EDEMA: ICD-10-CM

## 2020-01-21 PROCEDURE — 97140 MANUAL THERAPY 1/> REGIONS: CPT | Mod: GP | Performed by: PHYSICAL THERAPY ASSISTANT

## 2020-01-21 PROCEDURE — 97016 VASOPNEUMATIC DEVICE THERAPY: CPT | Mod: GP | Performed by: PHYSICAL THERAPY ASSISTANT

## 2020-01-21 PROCEDURE — 97110 THERAPEUTIC EXERCISES: CPT | Mod: GP | Performed by: PHYSICAL THERAPY ASSISTANT

## 2020-01-24 ENCOUNTER — THERAPY VISIT (OUTPATIENT)
Dept: PHYSICAL THERAPY | Facility: CLINIC | Age: 30
End: 2020-01-24
Payer: COMMERCIAL

## 2020-01-24 DIAGNOSIS — R60.9 EDEMA: ICD-10-CM

## 2020-01-24 DIAGNOSIS — S89.90XA: ICD-10-CM

## 2020-01-24 DIAGNOSIS — S83.519A ACL (ANTERIOR CRUCIATE LIGAMENT) TEAR: ICD-10-CM

## 2020-01-24 PROCEDURE — 97530 THERAPEUTIC ACTIVITIES: CPT | Mod: GP | Performed by: PHYSICAL THERAPIST

## 2020-01-24 PROCEDURE — 97110 THERAPEUTIC EXERCISES: CPT | Mod: GP | Performed by: PHYSICAL THERAPIST

## 2020-01-24 PROCEDURE — 97016 VASOPNEUMATIC DEVICE THERAPY: CPT | Mod: GP | Performed by: PHYSICAL THERAPIST

## 2020-01-24 PROCEDURE — 97140 MANUAL THERAPY 1/> REGIONS: CPT | Mod: GP | Performed by: PHYSICAL THERAPIST

## 2020-01-24 NOTE — PROGRESS NOTES
DISCHARGE REPORT    Progress reporting period is from 1/13/20 to 1/24/20.       SUBJECTIVE  Subjective: Knee feeling better in general.  Ready for surgery on Tuesday.    Changes in function:  Yes (See Goal flowsheet attached for changes in current functional level)  Adverse reaction to treatment or activity: None    OBJECTIVE  Changes noted in objective findings:  Yes  Objective: PROM 2-0-108.  SLR without lag.  Resolving effusion.     ASSESSMENT/PLAN  Updated problem list and treatment plan: Diagnosis 1:  preop multiliagmentous knee injury    STG/LTGs have been met or progress has been made towards goals:  Yes (See Goal flow sheet completed today.)  Assessment of Progress: The patient's condition is improving.  Self Management Plans:  Patient has been instructed in a home treatment program.  I have re-evaluated this patient and find that the nature, scope, duration and intensity of the therapy is appropriate for the medical condition of the patient.  Kamryn continues to require the following intervention to meet STG and LTG's:  PT intervention is no longer required to meet STG/LTG.    Recommendations:  This patient is ready to be discharged from therapy and continue their home treatment program.  She will undergo surgery on 1/28/20.    Please refer to the daily flowsheet for treatment today, total treatment time and time spent performing 1:1 timed codes.

## 2020-01-25 ENCOUNTER — ANESTHESIA EVENT (OUTPATIENT)
Dept: SURGERY | Facility: CLINIC | Age: 30
End: 2020-01-25
Payer: COMMERCIAL

## 2020-01-28 ENCOUNTER — HOSPITAL ENCOUNTER (OUTPATIENT)
Facility: CLINIC | Age: 30
Setting detail: OBSERVATION
Discharge: HOME | End: 2020-01-29
Attending: ORTHOPAEDIC SURGERY | Admitting: ORTHOPAEDIC SURGERY
Payer: COMMERCIAL

## 2020-01-28 ENCOUNTER — ANESTHESIA (OUTPATIENT)
Dept: SURGERY | Facility: CLINIC | Age: 30
End: 2020-01-28
Payer: COMMERCIAL

## 2020-01-28 DIAGNOSIS — S83.511D RUPTURE OF ANTERIOR CRUCIATE LIGAMENT OF RIGHT KNEE, SUBSEQUENT ENCOUNTER: Primary | ICD-10-CM

## 2020-01-28 DIAGNOSIS — M25.561 CHRONIC PAIN OF RIGHT KNEE: ICD-10-CM

## 2020-01-28 DIAGNOSIS — G89.29 CHRONIC PAIN OF RIGHT KNEE: ICD-10-CM

## 2020-01-28 PROBLEM — S83.511A RIGHT ACL TEAR: Status: ACTIVE | Noted: 2020-01-28

## 2020-01-28 LAB
GLUCOSE BLDC GLUCOMTR-MCNC: 80 MG/DL (ref 70–99)
HCG UR QL: NEGATIVE

## 2020-01-28 PROCEDURE — 25000128 H RX IP 250 OP 636: Performed by: ORTHOPAEDIC SURGERY

## 2020-01-28 PROCEDURE — 25000132 ZZH RX MED GY IP 250 OP 250 PS 637: Performed by: STUDENT IN AN ORGANIZED HEALTH CARE EDUCATION/TRAINING PROGRAM

## 2020-01-28 PROCEDURE — 25000125 ZZHC RX 250: Performed by: REGISTERED NURSE

## 2020-01-28 PROCEDURE — 25800030 ZZH RX IP 258 OP 636: Performed by: ANESTHESIOLOGY

## 2020-01-28 PROCEDURE — 25000566 ZZH SEVOFLURANE, EA 15 MIN: Performed by: ORTHOPAEDIC SURGERY

## 2020-01-28 PROCEDURE — 25000125 ZZHC RX 250: Performed by: ANESTHESIOLOGY

## 2020-01-28 PROCEDURE — 40000170 ZZH STATISTIC PRE-PROCEDURE ASSESSMENT II: Performed by: ORTHOPAEDIC SURGERY

## 2020-01-28 PROCEDURE — 37000008 ZZH ANESTHESIA TECHNICAL FEE, 1ST 30 MIN: Performed by: ORTHOPAEDIC SURGERY

## 2020-01-28 PROCEDURE — 25000128 H RX IP 250 OP 636: Performed by: REGISTERED NURSE

## 2020-01-28 PROCEDURE — 71000017 ZZH RECOVERY PHASE 1 LEVEL 3 EA ADDTL HR: Performed by: ORTHOPAEDIC SURGERY

## 2020-01-28 PROCEDURE — 25800025 ZZH RX 258: Performed by: ORTHOPAEDIC SURGERY

## 2020-01-28 PROCEDURE — 25000125 ZZHC RX 250: Performed by: ORTHOPAEDIC SURGERY

## 2020-01-28 PROCEDURE — 25800030 ZZH RX IP 258 OP 636: Performed by: REGISTERED NURSE

## 2020-01-28 PROCEDURE — 36000064 ZZH SURGERY LEVEL 4 EA 15 ADDTL MIN - UMMC: Performed by: ORTHOPAEDIC SURGERY

## 2020-01-28 PROCEDURE — 36000062 ZZH SURGERY LEVEL 4 1ST 30 MIN - UMMC: Performed by: ORTHOPAEDIC SURGERY

## 2020-01-28 PROCEDURE — G0378 HOSPITAL OBSERVATION PER HR: HCPCS

## 2020-01-28 PROCEDURE — 25000128 H RX IP 250 OP 636: Performed by: ANESTHESIOLOGY

## 2020-01-28 PROCEDURE — 71000016 ZZH RECOVERY PHASE 1 LEVEL 3 FIRST HR: Performed by: ORTHOPAEDIC SURGERY

## 2020-01-28 PROCEDURE — 00000146 ZZHCL STATISTIC GLUCOSE BY METER IP

## 2020-01-28 PROCEDURE — 25000128 H RX IP 250 OP 636: Performed by: NURSE ANESTHETIST, CERTIFIED REGISTERED

## 2020-01-28 PROCEDURE — 81025 URINE PREGNANCY TEST: CPT | Performed by: STUDENT IN AN ORGANIZED HEALTH CARE EDUCATION/TRAINING PROGRAM

## 2020-01-28 PROCEDURE — 25000128 H RX IP 250 OP 636: Performed by: STUDENT IN AN ORGANIZED HEALTH CARE EDUCATION/TRAINING PROGRAM

## 2020-01-28 PROCEDURE — C1763 CONN TISS, NON-HUMAN: HCPCS | Performed by: ORTHOPAEDIC SURGERY

## 2020-01-28 PROCEDURE — C1713 ANCHOR/SCREW BN/BN,TIS/BN: HCPCS | Performed by: ORTHOPAEDIC SURGERY

## 2020-01-28 PROCEDURE — 37000009 ZZH ANESTHESIA TECHNICAL FEE, EACH ADDTL 15 MIN: Performed by: ORTHOPAEDIC SURGERY

## 2020-01-28 PROCEDURE — 27210794 ZZH OR GENERAL SUPPLY STERILE: Performed by: ORTHOPAEDIC SURGERY

## 2020-01-28 DEVICE — IMP BUTTON ARTHREX ABS TIGHT ROPE 11MM BUTTON AR-1588TB-3: Type: IMPLANTABLE DEVICE | Site: KNEE | Status: FUNCTIONAL

## 2020-01-28 DEVICE — IMP ANCHOR ARTHREX ABS TIGHT ROPE IMP & BUTTON AR-1588TN: Type: IMPLANTABLE DEVICE | Site: KNEE | Status: FUNCTIONAL

## 2020-01-28 DEVICE — IMP SCR ARTHREX BIOCOMPOSITE INTERFERENCE 6X23MM AR-1360C: Type: IMPLANTABLE DEVICE | Site: KNEE | Status: FUNCTIONAL

## 2020-01-28 DEVICE — GRAFT TENDON SEMITENDINOSUS 26CM 430355: Type: IMPLANTABLE DEVICE | Site: KNEE | Status: FUNCTIONAL

## 2020-01-28 DEVICE — IMP ANCHOR ARTHREX ACL TIGHT ROPE REPAIR AR-1588RT: Type: IMPLANTABLE DEVICE | Site: KNEE | Status: FUNCTIONAL

## 2020-01-28 DEVICE — IMP SCR ARTHREX BIOCOMPOSITE INTERFERENCE 7X23MM AR-1370C: Type: IMPLANTABLE DEVICE | Site: KNEE | Status: FUNCTIONAL

## 2020-01-28 RX ORDER — FENTANYL CITRATE 50 UG/ML
INJECTION, SOLUTION INTRAMUSCULAR; INTRAVENOUS PRN
Status: DISCONTINUED | OUTPATIENT
Start: 2020-01-28 | End: 2020-01-28

## 2020-01-28 RX ORDER — ACETAMINOPHEN 325 MG/1
975 TABLET ORAL EVERY 8 HOURS PRN
Status: DISCONTINUED | OUTPATIENT
Start: 2020-01-28 | End: 2020-01-29

## 2020-01-28 RX ORDER — ONDANSETRON 4 MG/1
4 TABLET, ORALLY DISINTEGRATING ORAL EVERY 6 HOURS PRN
Status: DISCONTINUED | OUTPATIENT
Start: 2020-01-28 | End: 2020-01-29 | Stop reason: HOSPADM

## 2020-01-28 RX ORDER — HYDROMORPHONE HYDROCHLORIDE 1 MG/ML
.2-.4 INJECTION, SOLUTION INTRAMUSCULAR; INTRAVENOUS; SUBCUTANEOUS EVERY 10 MIN PRN
Status: DISCONTINUED | OUTPATIENT
Start: 2020-01-28 | End: 2020-01-28 | Stop reason: HOSPADM

## 2020-01-28 RX ORDER — AMOXICILLIN 250 MG
1-2 CAPSULE ORAL 2 TIMES DAILY
Qty: 24 TABLET | Refills: 0 | Status: SHIPPED | OUTPATIENT
Start: 2020-01-28

## 2020-01-28 RX ORDER — IBUPROFEN 600 MG/1
600 TABLET, FILM COATED ORAL EVERY 8 HOURS PRN
Qty: 40 TABLET | Refills: 0 | Status: SHIPPED | OUTPATIENT
Start: 2020-01-28 | End: 2020-01-29

## 2020-01-28 RX ORDER — PROPOFOL 10 MG/ML
INJECTION, EMULSION INTRAVENOUS PRN
Status: DISCONTINUED | OUTPATIENT
Start: 2020-01-28 | End: 2020-01-28

## 2020-01-28 RX ORDER — SODIUM CHLORIDE, SODIUM LACTATE, POTASSIUM CHLORIDE, CALCIUM CHLORIDE 600; 310; 30; 20 MG/100ML; MG/100ML; MG/100ML; MG/100ML
INJECTION, SOLUTION INTRAVENOUS CONTINUOUS
Status: DISCONTINUED | OUTPATIENT
Start: 2020-01-28 | End: 2020-01-28 | Stop reason: HOSPADM

## 2020-01-28 RX ORDER — DEXAMETHASONE SODIUM PHOSPHATE 10 MG/ML
INJECTION, SOLUTION INTRAMUSCULAR; INTRAVENOUS PRN
Status: DISCONTINUED | OUTPATIENT
Start: 2020-01-28 | End: 2020-01-28

## 2020-01-28 RX ORDER — PROPOFOL 10 MG/ML
INJECTION, EMULSION INTRAVENOUS CONTINUOUS PRN
Status: DISCONTINUED | OUTPATIENT
Start: 2020-01-28 | End: 2020-01-28

## 2020-01-28 RX ORDER — LIDOCAINE HYDROCHLORIDE 20 MG/ML
INJECTION, SOLUTION INFILTRATION; PERINEURAL PRN
Status: DISCONTINUED | OUTPATIENT
Start: 2020-01-28 | End: 2020-01-28

## 2020-01-28 RX ORDER — OXYCODONE HYDROCHLORIDE 10 MG/1
10 TABLET ORAL
Status: DISCONTINUED | OUTPATIENT
Start: 2020-01-28 | End: 2020-01-29

## 2020-01-28 RX ORDER — LIDOCAINE 40 MG/G
CREAM TOPICAL
Status: DISCONTINUED | OUTPATIENT
Start: 2020-01-28 | End: 2020-01-29 | Stop reason: HOSPADM

## 2020-01-28 RX ORDER — ONDANSETRON 4 MG/1
4-8 TABLET, ORALLY DISINTEGRATING ORAL EVERY 8 HOURS PRN
Qty: 4 TABLET | Refills: 0 | Status: SHIPPED | OUTPATIENT
Start: 2020-01-28

## 2020-01-28 RX ORDER — FENTANYL CITRATE 50 UG/ML
25-50 INJECTION, SOLUTION INTRAMUSCULAR; INTRAVENOUS
Status: DISCONTINUED | OUTPATIENT
Start: 2020-01-28 | End: 2020-01-28

## 2020-01-28 RX ORDER — BUPIVACAINE HYDROCHLORIDE AND EPINEPHRINE 2.5; 5 MG/ML; UG/ML
INJECTION, SOLUTION INFILTRATION; PERINEURAL PRN
Status: DISCONTINUED | OUTPATIENT
Start: 2020-01-28 | End: 2020-01-28

## 2020-01-28 RX ORDER — OXYCODONE HYDROCHLORIDE 5 MG/1
5 TABLET ORAL EVERY 4 HOURS PRN
Status: DISCONTINUED | OUTPATIENT
Start: 2020-01-28 | End: 2020-01-28 | Stop reason: HOSPADM

## 2020-01-28 RX ORDER — ACETAMINOPHEN 325 MG/1
650 TABLET ORAL EVERY 6 HOURS PRN
Status: DISCONTINUED | OUTPATIENT
Start: 2020-01-28 | End: 2020-01-28 | Stop reason: HOSPADM

## 2020-01-28 RX ORDER — BUPIVACAINE HYDROCHLORIDE AND EPINEPHRINE 2.5; 5 MG/ML; UG/ML
INJECTION, SOLUTION INFILTRATION; PERINEURAL PRN
Status: DISCONTINUED | OUTPATIENT
Start: 2020-01-28 | End: 2020-01-28 | Stop reason: HOSPADM

## 2020-01-28 RX ORDER — METHOCARBAMOL 750 MG/1
750 TABLET, FILM COATED ORAL
Status: DISCONTINUED | OUTPATIENT
Start: 2020-01-28 | End: 2020-01-29 | Stop reason: HOSPADM

## 2020-01-28 RX ORDER — ONDANSETRON 4 MG/1
4 TABLET, ORALLY DISINTEGRATING ORAL EVERY 30 MIN PRN
Status: DISCONTINUED | OUTPATIENT
Start: 2020-01-28 | End: 2020-01-28 | Stop reason: HOSPADM

## 2020-01-28 RX ORDER — NALOXONE HYDROCHLORIDE 0.4 MG/ML
.1-.4 INJECTION, SOLUTION INTRAMUSCULAR; INTRAVENOUS; SUBCUTANEOUS
Status: DISCONTINUED | OUTPATIENT
Start: 2020-01-28 | End: 2020-01-28 | Stop reason: HOSPADM

## 2020-01-28 RX ORDER — ONDANSETRON 2 MG/ML
INJECTION INTRAMUSCULAR; INTRAVENOUS PRN
Status: DISCONTINUED | OUTPATIENT
Start: 2020-01-28 | End: 2020-01-28

## 2020-01-28 RX ORDER — ONDANSETRON 2 MG/ML
4 INJECTION INTRAMUSCULAR; INTRAVENOUS EVERY 6 HOURS PRN
Status: DISCONTINUED | OUTPATIENT
Start: 2020-01-28 | End: 2020-01-29 | Stop reason: HOSPADM

## 2020-01-28 RX ORDER — LIDOCAINE 40 MG/G
CREAM TOPICAL
Status: DISCONTINUED | OUTPATIENT
Start: 2020-01-28 | End: 2020-01-28 | Stop reason: HOSPADM

## 2020-01-28 RX ORDER — GABAPENTIN 100 MG/1
300 CAPSULE ORAL ONCE
Status: COMPLETED | OUTPATIENT
Start: 2020-01-28 | End: 2020-01-28

## 2020-01-28 RX ORDER — DEXAMETHASONE SODIUM PHOSPHATE 4 MG/ML
INJECTION, SOLUTION INTRA-ARTICULAR; INTRALESIONAL; INTRAMUSCULAR; INTRAVENOUS; SOFT TISSUE PRN
Status: DISCONTINUED | OUTPATIENT
Start: 2020-01-28 | End: 2020-01-28

## 2020-01-28 RX ORDER — ACETAMINOPHEN 325 MG/1
650 TABLET ORAL ONCE
Status: COMPLETED | OUTPATIENT
Start: 2020-01-28 | End: 2020-01-28

## 2020-01-28 RX ORDER — HYDROMORPHONE HCL/0.9% NACL/PF 0.2MG/0.2
0.2 SYRINGE (ML) INTRAVENOUS
Status: DISCONTINUED | OUTPATIENT
Start: 2020-01-28 | End: 2020-01-29 | Stop reason: HOSPADM

## 2020-01-28 RX ORDER — HYDROXYZINE HYDROCHLORIDE 25 MG/1
25 TABLET, FILM COATED ORAL EVERY 6 HOURS PRN
Status: DISCONTINUED | OUTPATIENT
Start: 2020-01-28 | End: 2020-01-29 | Stop reason: HOSPADM

## 2020-01-28 RX ORDER — FENTANYL CITRATE 50 UG/ML
25-50 INJECTION, SOLUTION INTRAMUSCULAR; INTRAVENOUS EVERY 5 MIN PRN
Status: DISCONTINUED | OUTPATIENT
Start: 2020-01-28 | End: 2020-01-28 | Stop reason: HOSPADM

## 2020-01-28 RX ORDER — FLUMAZENIL 0.1 MG/ML
0.2 INJECTION, SOLUTION INTRAVENOUS
Status: DISCONTINUED | OUTPATIENT
Start: 2020-01-28 | End: 2020-01-28 | Stop reason: HOSPADM

## 2020-01-28 RX ORDER — CEFAZOLIN SODIUM 2 G/100ML
2 INJECTION, SOLUTION INTRAVENOUS
Status: COMPLETED | OUTPATIENT
Start: 2020-01-28 | End: 2020-01-28

## 2020-01-28 RX ORDER — ACETAMINOPHEN 325 MG/1
1000 TABLET ORAL EVERY 4 HOURS
Qty: 120 TABLET | Refills: 0 | Status: SHIPPED | OUTPATIENT
Start: 2020-01-28

## 2020-01-28 RX ORDER — CEFAZOLIN SODIUM 1 G/3ML
1 INJECTION, POWDER, FOR SOLUTION INTRAMUSCULAR; INTRAVENOUS SEE ADMIN INSTRUCTIONS
Status: DISCONTINUED | OUTPATIENT
Start: 2020-01-28 | End: 2020-01-28 | Stop reason: HOSPADM

## 2020-01-28 RX ORDER — MEPERIDINE HYDROCHLORIDE 25 MG/ML
12.5 INJECTION INTRAMUSCULAR; INTRAVENOUS; SUBCUTANEOUS
Status: DISCONTINUED | OUTPATIENT
Start: 2020-01-28 | End: 2020-01-28 | Stop reason: HOSPADM

## 2020-01-28 RX ORDER — OXYCODONE HYDROCHLORIDE 5 MG/1
5-10 TABLET ORAL
Status: DISCONTINUED | OUTPATIENT
Start: 2020-01-28 | End: 2020-01-29

## 2020-01-28 RX ORDER — ONDANSETRON 2 MG/ML
4 INJECTION INTRAMUSCULAR; INTRAVENOUS EVERY 30 MIN PRN
Status: DISCONTINUED | OUTPATIENT
Start: 2020-01-28 | End: 2020-01-28 | Stop reason: HOSPADM

## 2020-01-28 RX ORDER — SODIUM CHLORIDE, SODIUM LACTATE, POTASSIUM CHLORIDE, CALCIUM CHLORIDE 600; 310; 30; 20 MG/100ML; MG/100ML; MG/100ML; MG/100ML
INJECTION, SOLUTION INTRAVENOUS CONTINUOUS PRN
Status: DISCONTINUED | OUTPATIENT
Start: 2020-01-28 | End: 2020-01-28

## 2020-01-28 RX ORDER — OXYCODONE HYDROCHLORIDE 5 MG/1
5-10 TABLET ORAL EVERY 4 HOURS PRN
Qty: 30 TABLET | Refills: 0 | Status: SHIPPED | OUTPATIENT
Start: 2020-01-28 | End: 2020-01-29

## 2020-01-28 RX ORDER — NALOXONE HYDROCHLORIDE 0.4 MG/ML
.1-.4 INJECTION, SOLUTION INTRAMUSCULAR; INTRAVENOUS; SUBCUTANEOUS
Status: DISCONTINUED | OUTPATIENT
Start: 2020-01-28 | End: 2020-01-29 | Stop reason: HOSPADM

## 2020-01-28 RX ORDER — ONDANSETRON 4 MG/1
4 TABLET, ORALLY DISINTEGRATING ORAL
Status: DISCONTINUED | OUTPATIENT
Start: 2020-01-28 | End: 2020-01-29 | Stop reason: HOSPADM

## 2020-01-28 RX ADMIN — CEFAZOLIN SODIUM 2 G: 2 INJECTION, SOLUTION INTRAVENOUS at 13:55

## 2020-01-28 RX ADMIN — FENTANYL CITRATE 50 MCG: 50 INJECTION, SOLUTION INTRAMUSCULAR; INTRAVENOUS at 12:27

## 2020-01-28 RX ADMIN — LIDOCAINE HYDROCHLORIDE 40 MG: 20 INJECTION, SOLUTION INFILTRATION; PERINEURAL at 13:39

## 2020-01-28 RX ADMIN — ACETAMINOPHEN 650 MG: 325 TABLET, FILM COATED ORAL at 18:33

## 2020-01-28 RX ADMIN — FENTANYL CITRATE 25 MCG: 50 INJECTION, SOLUTION INTRAMUSCULAR; INTRAVENOUS at 19:06

## 2020-01-28 RX ADMIN — FENTANYL CITRATE 25 MCG: 50 INJECTION, SOLUTION INTRAMUSCULAR; INTRAVENOUS at 17:44

## 2020-01-28 RX ADMIN — ACETAMINOPHEN 650 MG: 325 TABLET, FILM COATED ORAL at 12:17

## 2020-01-28 RX ADMIN — HYDROMORPHONE HYDROCHLORIDE 0.4 MG: 1 INJECTION, SOLUTION INTRAMUSCULAR; INTRAVENOUS; SUBCUTANEOUS at 19:19

## 2020-01-28 RX ADMIN — HYDROMORPHONE HYDROCHLORIDE 0.25 MG: 1 INJECTION, SOLUTION INTRAMUSCULAR; INTRAVENOUS; SUBCUTANEOUS at 17:14

## 2020-01-28 RX ADMIN — OXYCODONE HYDROCHLORIDE 5 MG: 5 TABLET ORAL at 19:38

## 2020-01-28 RX ADMIN — CEFAZOLIN SODIUM 1 G: 2 INJECTION, SOLUTION INTRAVENOUS at 15:55

## 2020-01-28 RX ADMIN — GABAPENTIN 300 MG: 300 CAPSULE ORAL at 12:17

## 2020-01-28 RX ADMIN — SODIUM CHLORIDE, POTASSIUM CHLORIDE, SODIUM LACTATE AND CALCIUM CHLORIDE: 600; 310; 30; 20 INJECTION, SOLUTION INTRAVENOUS at 13:33

## 2020-01-28 RX ADMIN — ONDANSETRON 4 MG: 2 INJECTION INTRAMUSCULAR; INTRAVENOUS at 16:53

## 2020-01-28 RX ADMIN — FENTANYL CITRATE 25 MCG: 50 INJECTION, SOLUTION INTRAMUSCULAR; INTRAVENOUS at 15:38

## 2020-01-28 RX ADMIN — DEXMEDETOMIDINE HYDROCHLORIDE 20 MCG: 100 INJECTION, SOLUTION INTRAVENOUS at 12:32

## 2020-01-28 RX ADMIN — MIDAZOLAM 1 MG: 1 INJECTION INTRAMUSCULAR; INTRAVENOUS at 12:26

## 2020-01-28 RX ADMIN — HYDROMORPHONE HYDROCHLORIDE 0.3 MG: 1 INJECTION, SOLUTION INTRAMUSCULAR; INTRAVENOUS; SUBCUTANEOUS at 19:00

## 2020-01-28 RX ADMIN — BUPIVACAINE HYDROCHLORIDE AND EPINEPHRINE BITARTRATE 20 ML: 2.5; .005 INJECTION, SOLUTION INFILTRATION; PERINEURAL at 12:32

## 2020-01-28 RX ADMIN — HYDROMORPHONE HYDROCHLORIDE 0.3 MG: 1 INJECTION, SOLUTION INTRAMUSCULAR; INTRAVENOUS; SUBCUTANEOUS at 18:17

## 2020-01-28 RX ADMIN — FENTANYL CITRATE 25 MCG: 50 INJECTION, SOLUTION INTRAMUSCULAR; INTRAVENOUS at 13:39

## 2020-01-28 RX ADMIN — ROCURONIUM BROMIDE 40 MG: 10 INJECTION INTRAVENOUS at 13:39

## 2020-01-28 RX ADMIN — PROPOFOL 100 MG: 10 INJECTION, EMULSION INTRAVENOUS at 13:39

## 2020-01-28 RX ADMIN — PROPOFOL 50 MCG/KG/MIN: 10 INJECTION, EMULSION INTRAVENOUS at 13:50

## 2020-01-28 RX ADMIN — HYDROMORPHONE HYDROCHLORIDE 25 MG: 1 INJECTION, SOLUTION INTRAMUSCULAR; INTRAVENOUS; SUBCUTANEOUS at 17:06

## 2020-01-28 RX ADMIN — DEXAMETHASONE SODIUM PHOSPHATE 1 MG: 10 INJECTION, SOLUTION INTRAMUSCULAR; INTRAVENOUS at 12:32

## 2020-01-28 RX ADMIN — DEXAMETHASONE SODIUM PHOSPHATE 6 MG: 4 INJECTION, SOLUTION INTRAMUSCULAR; INTRAVENOUS at 13:54

## 2020-01-28 ASSESSMENT — MIFFLIN-ST. JEOR: SCORE: 1286

## 2020-01-28 NOTE — OP NOTE
PREOPERATIVE DIAGNOSIS:   1. Grade 3 rupture of the right anterior cruciate ligament  2. Grade 3 rupture of the right fibular collateral ligament  3. Potential injury to the popliteus tendon  4. Intact medial collateral ligament and posterior cruciate ligament  5. Intact peroneal nerve  6. Biceps femoris avulsion  7. Impaction injury of the medial femoral condyle    POSTOPERATIVE DIAGNOSIS:  1. Grade 3 rupture of the right anterior cruciate ligament  2. Grade 3 rupture of the right fibular collateral ligament  3. Intact popliteus tendon with good tension to probing  4. Intact medial collateral ligament and posterior cruciate ligament  5. Intact peroneal nerve with 4 cm zone of injury at the level of the fibular head  6. Biceps femoris avulsion  7. Impaction injury of the medial femoral condyle    PROCEDURE:  1. Examination under anesthesia right knee  2. Right knee arthroscopy  3. ACL reconstruction hamstring autograft  4. Fibular collateral ligament reconstruction semi-tendinosis allograft  5. Biceps femoris repair  6. Peroneal nerve neural lysis  7. Medial femoral condyle chondroplasty    MODIFIER: I am requesting a 22 modifier for this procedure given the significance of the posterior lateral corner injury and the significant on a scar tissue which made the peroneal nerve neural lysis very difficult and nearly tripled the time of normal exposure    DATE OF SURGERY: 1/28/2020    SURGEON: Rodrigo Silva MD    ASSISTANT: None.     RESIDENT OR FELLOW: Angel Partida MD    OPERATIVE INDICATIONS: Kamryn Segundo is a pleasant 29 year old female who I saw through my orthopedic clinic with a history, physical, imaging consistent with a knee dislocation equivalent/multi-ligament knee injury with complete ruptures of her anterior cruciate ligament and fibular collateral ligament.  Her peroneal nerve was intact.  I discussed with her that this was a life-changing injury.  There would never be the same as it was before.   However I did think she needed improved stability as she is a very active young person who wants the healthy active lifestyle.  Together through a combined decision making approach would like to proceed with ACL reconstruction hamstring autograft, fibular collateral ligament reconstruction with allograft repair of the biceps femoris and peroneal nerve neural lysis..  I reviewed with the patient the risks, benefits, complications, techniques and alternatives to surgery.  We reviewed the expected course of recovery and the potential expected outcomes.  The patient understood both the risks and benefits and desired to proceed despite the risks.    OPERATIVE DETAILS: In the preoperative area the patient's informed consent was reviewed and they desired to proceed.  The right leg was marked and the patient was in agreement.  The patient was taken to the operating room where a timeout was performed and all parties were in agreement.  Preoperative antibiotics were given within 1 hour of the time of incision.  The patient was placed in the supine position and surrendered to LMA anesthesia.  No tourniquet was applied.  Egg crate was placed beneath the well leg and a side post was utilized.  The operative leg was prepped and draped in the usual sterile fashion.     Examination Under Anesthesia: Examination under anesthesia showed her to have range of motion from 0 to 135 degrees.  Stable to valgus stress testing.  2+ opening to varus stress testing in full extension.  3+ at 30 degrees.  No posterior lateral rotatory instability.  Posterior drawer was intact.  No valgus instability.  2B Lachman, 1+ pivot shift.    I like to perform hamstring autograft ACL reconstruction.  To do this a 3 cm incision was made carried onto the skin and subcutaneous tissues meticulous hemostasis was insured.  The fascia was opened.  Semi-tendinosis and gracilis were identified and these were harvested free.  They were taken to the back table where  graft link technology was employed.  The grafts were quadruple.  And running locking fiber loop was placed in each of the talus.  Circumferential sutures were placed at 1 and 2 cm from each end after quadrupled in the graft and the graft was tensioned at 20 pounds for 20 minutes.  Our graft dimensions measured 70 x 9 mm.  At this time we turned our attention to the arthroscopic portion of the procedure.  Anterior medial and anterior lateral arthroscopic portals were created and a diagnostic arthroscopy was performed the following findings: There were no loose bodies within the suprapatellar pouch, medial gutter, lateral gutter lateral femoral condyle, lateral tibial plateau were normal in appearance.  Massive lateral compartment drive-through sign.  Medial compartment is largely intact.  Large chondral defect with an impaction injury of the medial femoral condyle.  Is in the far medial aspect.  Approximately 2 cm.  It was clearly embedded.  There was a 1 to 2 mm step-off.  Medial tibial plateau and medial meniscus were intact.  No drive-through sign.  Grade 3 rupture of the ACL from its femoral origin.  PCL were intact.  Medial patella facet lateral patella facet central ridge were normal.  Trochlea normal.    At this time a debridement of the nonfunctioning ACL was performed till we can visualize the anatomic insertion sites in both the femoral and the tibial side.  A nest was created in the tibial side.  Our 6-9 guide was introduced and a 9 mm flip cutter was brought into the anatomic insertion of the ACL.  A 25 mm socket was reamed.  Passing sutures were placed.  Tip to tip guide was then introduced and a 30 mm socket with a 50 mm osseous length was performed on the tibial side.  Passing sutures were placed, the medial portal was large and we confirmed there were no soft tissue bridges of the sutures.  At this time a chondroplasty medial femoral condyle was performed.  Attempts were made to disimpact the embedded  section of bone within the medial femoral condyle however this could not be performed.  We smoothed out the unstable cartilage edges and made plans that if she should have symptoms in this area in the future we would plan for osteochondral allograft transplantation to recontour the femoral condyle.    At this time a 12 cm curvilinear apex anterior incision was made over the mid lateral position of the right leg it was carried down through the skin and subcutaneous tissues.  The IT band was easily identified.  The biceps had been avulsed.  Extensive dissection was then performed around the peroneal nerve.  It was identified in the posterior aspect of the biceps.  Massive scar tissue was noted around the nerve.  This was a very detailed and difficult dissection which was nearly 3 times the expected duration.  After we protected the peroneal nerve with Vesseloops and we confirmed that it was well decompressed it proximal to the biceps femoris and distal to when it dove into the anterior compartment we placed a 2.4 mm guide to pin across the fibular head.  We reamed with a 6 mm reamer.  Passing sutures were placed.  The we opened the IT band longitudinally.  And identified the femoral insertion of the fibular collateral ligament.  It was identified just proximal and posterior to the femoral epicondyle and easily we could visualize fibers.  A drill to pin was placed in this and we angled proximal superior.  We viewed arthroscopically to confirm the tunnels did not coalesce during pin placement as well as reaming.  We reamed to a depth of 30 mm.  On the back table we prepared our semi-tendinosis allograft.  It was seen to fit through a size 6.  Running locking fiber loop were placed on each tail.  The graft was kept long.    At this time her ACL graft was brought up and the cortical button was deployed over the lateral cortex.  15 mm of graft was then reduced in the femoral tunnel.  The remainder the graft was reduced on  the tibial side.  Tensioning and retensioning it was then performed in full extension with the ABS button on the tibial side.  Knot-tying was completed the sutures were cut short.  This reconstructed her central core.  Our graft was then brought up and reduced into the to the femoral tunnel was fixed with a 7 x 23 mm bio composite screw.  At some point hemostat was used to route this deep to the biceps femoris and IT band on the lateral aspect.  And in a lateral to medial direction across the fibular head the FCL graft was placed.  At 30 degrees of flexion.  In maximum valgus and maximal manual traction a 6 x 23 mm bio composite screw was placed with excellent purchase.  At this time the graft was routed around the posterior aspect of the fibular collateral ligament and sutured back onto itself is long as well as onto the anterior periosteum of the fibula creating a sling for the fibula.  Knot-tying was completed and the remainder the graft was cut short.  At this time running locking fiber loop was placed up and down the avulsed biceps femoris.  And through the tunnels on the fibular head the sutures were then used to tie the biceps femoris back to the fibular head.  Irrigation was performed and a layered closure was completed of the lateral side with 0 Vicryl, 2-0 Vicryl, Monocryl.  Anteriorly we closed with 0 Vicryl 2-0 Vicryl Monocryl the portal sites were closed with nylon.  Sterile dressings were applied.  A hinged knee brace was placed.  Patient was awakened for anesthesia and transferred to the recovery room in stable conditions with stable vital signs    ESTIMATED BLOOD LOSS: 100 mL.    TOURNIQUET TIME: No tourniquet was placed.    COMPLICATIONS: None apparent.    DRAINS: None.    SPECIMENS: None.     POSTOPERATIVE PLAN:  1. Patient will be allowed to discharge home if she meets the same day discharge criteria  2. Toe-touch weightbearing right lower extremity  3. No motion right lower  extremity  4. Follow-up 1 week  5. Will initiate STaR protocol at the 1 week trisha  6. No chemical DVT prophylaxis  7. Shower on day 3  8. Oxycodone for pain control

## 2020-01-28 NOTE — PROGRESS NOTES
"   01/28/20 1252   Values Beliefs and Spiritual Care   C: Community: In support of your spiritual health, is there someone we may contact for you? (identify all that apply)   (shs/1-28)   Visit Information   Visit Made By Staff    Type of Visit Initial;On-call;Staff consultation/triage;Surgical   Visited Patient;Friend(s)   Interventions   Plan of Care Review   With patient/family/proxy   Basic Spiritual Interventions    introduction/orientation to Spiritual Health Services;Assessment of spiritual needs/resources;Prayer   Advanced Assessments/Interventions   Presenting Concerns/Issues Spiritual/Nondenominational/emotional support   Presurgical  Visit - 3A WB  Data: Pt visited following request for  support. Met with Kamryn and her friend, José Luis Mendez.  Kamryn laughed and told her nurse that the drugs had really made her feel strange.  She is a  at the [employer]; she hurt herself bouldering for fun. \"I want to continue to work on my thesis; I need to speak with my advisor about my research.\" José Luis Mendez said that Kamryn's mom really wants to come to take care of her, but right now, with the health restrictions, she doesn't know if she can leave [facility].  Kamryn said that she has no particular Quaker. We shared a prayer for an excellent surgery with an easy and good recovery.   Rev. Sabrina Alcazar, Staff   [address] [address]  Phone: [phone]  Pager: [phone]    * [facility] remains available 24/7 for emergent requests/referrals, either by having the switchboard page the on-call  or by entering an ASAP/STAT consult in Epic (this will also page the on-call ).*          "

## 2020-01-28 NOTE — ANESTHESIA PREPROCEDURE EVALUATION
Anesthesia Pre-Procedure Evaluation    Patient: Kamryn Segundo   MRN:     [id number] Gender:   female   Age:    29 year old :      1990        Preoperative Diagnosis: Chronic pain of right knee [M25.561, G89.29]   Procedure(s):  Examination Under Anesthesia Right Knee, Right Knee Arthroscopy, ACL Reconstruction with Hamstring Autograft,  Right Posterior Lateral Corner Reconstruction With Allograft, Biceps Femoris Repair, Peroneal Nerve Neural Lysis     Past Medical History:   Diagnosis Date     Pollen allergy      Temporary low platelet count (H)       Past Surgical History:   Procedure Laterality Date     NO HISTORY OF SURGERY       wisdom teeth extraction            Anesthesia Evaluation     . Pt has not had prior anesthetic            ROS/MED HX    ENT/Pulmonary: Comment: Sometimes sinus issues - neg pulmonary ROS    (-) asthma and recent URI   Neurologic:  - neg neurologic ROS     Cardiovascular:  - neg cardiovascular ROS      (-) hypertension   METS/Exercise Tolerance: Comment: Currently limited by knee injury >4 METS   Hematologic:  - neg hematologic  ROS       Musculoskeletal: Comment: Knee injury        GI/Hepatic:  - neg GI/hepatic ROS      (-) GERD   Renal/Genitourinary:  - ROS Renal section negative       Endo:  - neg endo ROS       Psychiatric:  - neg psychiatric ROS       Infectious Disease:  - neg infectious disease ROS       Malignancy:      - no malignancy   Other:    (+) No chance of pregnancy                        PHYSICAL EXAM:   Mental Status/Neuro: A/A/O   Airway: Facies: Feasible  Mallampati: I  Mouth/Opening: Full  TM distance: > 6 cm  Neck ROM: Full   Respiratory: Auscultation: CTAB     Resp. Rate: Normal     Resp. Effort: Normal      CV: Rhythm: Regular  Rate: Age appropriate  Heart: Normal Sounds  Edema: None   Comments:      Dental: Normal Dentition                LABS:  CBC:   Lab Results   Component Value Date    WBC 3.7 (L) 2020    HGB 11.8 2020    HCT 36.8 2020  "    (L) 01/13/2020     BMP:   Lab Results   Component Value Date     01/13/2020    POTASSIUM 3.5 01/13/2020    CHLORIDE 108 01/13/2020    CO2 28 01/13/2020    BUN 9 01/13/2020    CR 0.70 01/13/2020    GLC 86 01/13/2020     COAGS: No results found for: PTT, INR, FIBR  POC: No results found for: BGM, HCG, HCGS  OTHER:   Lab Results   Component Value Date    ADDISON 8.9 01/13/2020        Preop Vitals    BP Readings from Last 3 Encounters:   01/13/20 110/72   12/28/19 106/41   12/28/19 121/77    Pulse Readings from Last 3 Encounters:   01/13/20 74   12/28/19 84   12/28/19 73      Resp Readings from Last 3 Encounters:   12/28/19 18   12/28/19 20    SpO2 Readings from Last 3 Encounters:   01/13/20 100%   12/28/19 97%   12/28/19 100%      Temp Readings from Last 1 Encounters:   12/28/19 36.9  C (98.4  F) (Oral)    Ht Readings from Last 1 Encounters:   01/08/20 1.626 m (5' 4\")      Wt Readings from Last 1 Encounters:   01/08/20 55.8 kg (123 lb)    Estimated body mass index is 21.11 kg/m  as calculated from the following:    Height as of 1/8/20: 1.626 m (5' 4\").    Weight as of 1/8/20: 55.8 kg (123 lb).     LDA:        Assessment:   ASA SCORE: 1    H&P: History and physical reviewed and following examination; no interval change.   Smoking Status:  Non-Smoker/Unknown   NPO Status: NPO Appropriate     Plan:   Anes. Type:  General; Peripheral Nerve Block; For Post-op pain in coordination with surgeon   Pre-Medication: None   Induction:  IV (Standard)   Airway: ETT; Oral   Access/Monitoring: PIV   Maintenance: Balanced     Postop Plan:   Postop Pain: Opioids  Postop Sedation/Airway: Not planned     PONV Management:   Adult Risk Factors: Female, Non-Smoker, Postop Opioids   Prevention: Ondansetron, Dexamethasone     CONSENT: Direct conversation   Plan and risks discussed with: Patient   Blood Products: Consent Deferred (Minimal Blood Loss)       Comments for Plan/Consent:  Discussed risks of anesthesia including nausea, " vomiting, sore throat, dental damage, cardiopulmonary complications,  and serious complications.                   Neha Gallegos MD

## 2020-01-28 NOTE — ANESTHESIA PROCEDURE NOTES
Peripheral Nerve Block Procedure Note    Staff:     Anesthesiologist:  Luigi Manuel MD  Location: Pre-op  Procedure Start/Stop TImes:      1/28/2020 12:27 PM     1/28/2020 12:35 PM    patient identified, IV checked, site marked, risks and benefits discussed, informed consent, monitors and equipment checked, pre-op evaluation, at physician/surgeon's request and post-op pain management      Correct Patient: Yes      Correct Position: Yes      Correct Site: Yes      Correct Procedure: Yes      Correct Laterality:  Yes    Site Marked:  Yes  Procedure details:     Procedure:  Adductor canal    ASA:  1    Diagnosis:  Right ACL tear    Laterality:  Right    Position:  Supine    Sterile Prep: chloraprep, mask and sterile gloves      Local skin infiltration:  2% lidocaine    amount (mL):  2    Needle:  Short bevel    Needle gauge:  21    Needle length (mm):  110    Ultrasound: Yes      Ultrasound used to identify targeted nerve, plexus, or vascular structure and placed a needle adjacent to it      Permanent Image entered into patiient's record      Abnormal pain on injection: No      Blood Aspirated: No      Paresthesias:  No    Bleeding at site: No      Bolus via:  Needle    Infusion Method:  Single Shot    Complications:  None  Assessment/Narrative:     Injection made incrementally with aspirations every (mL):  5

## 2020-01-28 NOTE — ANESTHESIA CARE TRANSFER NOTE
Patient: Kamryn Segundo    Procedure(s):  Examination Under Anesthesia Right Knee, Right Knee Arthroscopy, ACL Reconstruction with Hamstring Autograft,  Right Posterior Lateral Corner Reconstruction With Allograft, Biceps Femoris Repair, Peroneal Nerve Neural Lysis    Diagnosis: Chronic pain of right knee [M25.561, G89.29]  Diagnosis Additional Information: No value filed.    Anesthesia Type:   General, Peripheral Nerve Block, For Post-op pain in coordination with surgeon     Note:  Airway :Face Mask  Patient transferred to:PACU  Handoff Report: Identifed the Patient, Identified the Reponsible Provider, Reviewed the pertinent medical history, Discussed the surgical course, Reviewed Intra-OP anesthesia mangement and issues during anesthesia, Set expectations for post-procedure period and Allowed opportunity for questions and acknowledgement of understanding      Vitals: (Last set prior to Anesthesia Care Transfer)    CRNA VITALS  1/28/2020 1657 - 1/28/2020 1733      1/28/2020             Ht Rate:  74    Temp:  36.9  C (98.4  F)    SpO2:  100 %    Resp Rate (observed):  12    EKG:  Sinus rhythm                Electronically Signed By: OLGA Jay CRNA  January 28, 2020  5:33 PM

## 2020-01-28 NOTE — PROGRESS NOTES
Post op check     Patient seen in PACU with Dr. Silva    Sensation intact in sp/dp nerve distributions  Fires TA/EHL    Plan for discharge when she meets criteria.     Angel Partida MD  Orthopedic Surgery, PGY-5  Pager: [phone]

## 2020-01-29 ENCOUNTER — TELEPHONE (OUTPATIENT)
Dept: ORTHOPEDICS | Facility: CLINIC | Age: 30
End: 2020-01-29

## 2020-01-29 ENCOUNTER — PATIENT OUTREACH (OUTPATIENT)
Dept: CARE COORDINATION | Facility: CLINIC | Age: 30
End: 2020-01-29

## 2020-01-29 VITALS
OXYGEN SATURATION: 97 % | RESPIRATION RATE: 16 BRPM | DIASTOLIC BLOOD PRESSURE: 55 MMHG | HEART RATE: 61 BPM | SYSTOLIC BLOOD PRESSURE: 101 MMHG | TEMPERATURE: 97.6 F | HEIGHT: 64 IN | BODY MASS INDEX: 21.68 KG/M2 | WEIGHT: 126.98 LBS

## 2020-01-29 PROCEDURE — G0378 HOSPITAL OBSERVATION PER HR: HCPCS

## 2020-01-29 PROCEDURE — 25000128 H RX IP 250 OP 636: Performed by: STUDENT IN AN ORGANIZED HEALTH CARE EDUCATION/TRAINING PROGRAM

## 2020-01-29 PROCEDURE — 25000132 ZZH RX MED GY IP 250 OP 250 PS 637: Performed by: STUDENT IN AN ORGANIZED HEALTH CARE EDUCATION/TRAINING PROGRAM

## 2020-01-29 RX ORDER — IBUPROFEN 600 MG/1
600 TABLET, FILM COATED ORAL EVERY 8 HOURS PRN
Qty: 40 TABLET | Refills: 0 | Status: SHIPPED | OUTPATIENT
Start: 2020-01-29

## 2020-01-29 RX ORDER — OXYCODONE HYDROCHLORIDE 5 MG/1
5-10 TABLET ORAL EVERY 4 HOURS PRN
Qty: 30 TABLET | Refills: 0 | Status: SHIPPED | OUTPATIENT
Start: 2020-01-29

## 2020-01-29 RX ORDER — ACETAMINOPHEN 325 MG/1
975 TABLET ORAL EVERY 8 HOURS
Status: DISCONTINUED | OUTPATIENT
Start: 2020-01-29 | End: 2020-01-29 | Stop reason: HOSPADM

## 2020-01-29 RX ORDER — OXYCODONE HYDROCHLORIDE 5 MG/1
5-10 TABLET ORAL EVERY 4 HOURS PRN
Status: DISCONTINUED | OUTPATIENT
Start: 2020-01-29 | End: 2020-01-29 | Stop reason: HOSPADM

## 2020-01-29 RX ORDER — IBUPROFEN 600 MG/1
600 TABLET, FILM COATED ORAL 3 TIMES DAILY
Status: DISCONTINUED | OUTPATIENT
Start: 2020-01-29 | End: 2020-01-29 | Stop reason: HOSPADM

## 2020-01-29 RX ADMIN — ACETAMINOPHEN 975 MG: 325 TABLET, FILM COATED ORAL at 08:18

## 2020-01-29 RX ADMIN — ONDANSETRON 4 MG: 4 TABLET, ORALLY DISINTEGRATING ORAL at 01:05

## 2020-01-29 RX ADMIN — OXYCODONE HYDROCHLORIDE 5 MG: 5 TABLET ORAL at 05:11

## 2020-01-29 RX ADMIN — IBUPROFEN 600 MG: 600 TABLET ORAL at 08:18

## 2020-01-29 RX ADMIN — OXYCODONE HYDROCHLORIDE 5 MG: 5 TABLET ORAL at 01:05

## 2020-01-29 NOTE — ANESTHESIA POSTPROCEDURE EVALUATION
Anesthesia POST Procedure Evaluation    Patient: Kamryn Segundo   MRN:     [id number] Gender:   female   Age:    29 year old :      1990        Preoperative Diagnosis: Chronic pain of right knee [M25.561, G89.29]   Procedure(s):  Examination Under Anesthesia Right Knee, Right Knee Arthroscopy, ACL Reconstruction with Hamstring Autograft,  Right Posterior Lateral Corner Reconstruction With Allograft, Biceps Femoris Repair, Peroneal Nerve Neural Lysis   Postop Comments: No value filed.       Anesthesia Type:  Not documented  General, Peripheral Nerve Block, For Post-op pain in coordination with surgeon    Reportable Event: NO     PAIN: Uncomplicated   Sign Out status: Comfortable, Well controlled pain     PONV: No PONV   Sign Out status:  No Nausea or Vomiting     Neuro/Psych: Uneventful perioperative course   Sign Out Status: Preoperative baseline; Age appropriate mentation     Airway/Resp.: Uneventful perioperative course   Sign Out Status: Non labored breathing, age appropriate RR; Resp. Status within EXPECTED Parameters     CV: Uneventful perioperative course   Sign Out status: Appropriate BP and perfusion indices; Appropriate HR/Rhythm     Disposition:   Sign Out in:  PACU  Disposition:  Phase II; Home  Recovery Course: Uneventful  Follow-Up: Not required           Last Anesthesia Record Vitals:  CRNA VITALS  2020 1657 - 2020 1757      2020             Ht Rate:  74    Temp:  36.9  C (98.4  F)    SpO2:  100 %    Resp Rate (observed):  12    EKG:  Sinus rhythm          Last PACU Vitals:  Vitals Value Taken Time   /61 2020 10:00 PM   Temp 35.9  C (96.7  F) 2020  9:45 PM   Pulse 61 2020 10:00 PM   Resp 16 2020  9:45 PM   SpO2 99 % 2020 10:00 PM   Temp src     NIBP     Pulse     SpO2 100 % 2020  5:30 PM   Resp     Temp 36.9  C (98.4  F) 2020  5:30 PM   Ht Rate 74 2020  5:30 PM   Temp 2           Electronically Signed By: Dhiraj Archer DO, January  28, 2020, 10:24 PM

## 2020-01-29 NOTE — PROGRESS NOTES
"Orthopedics Daily Progress Note    S: no acute events overnight, pain controlled with PO medications, some nausea, tolerating crackers and juice, voiding, no numbness/tinglings    O: /61   Pulse 61   Temp 96.7  F (35.9  C) (Oral)   Resp 16   Ht 1.626 m (5' 4\")   Wt 57.6 kg (126 lb 15.8 oz)   LMP 01/07/2020 (Approximate)   SpO2 99%   BMI 21.80 kg/m      No acute distress  Non-labored respirations    RLE: HKB in place, locked in full ext; dressing with single small area of strikethrough in the center of the knee; 5/5 df/pf/ehl/fhl, sensation intact in deep peroneal, superficial peroneal, tibial, sural, saphenous nerve distributions, 2+ DP pulse.    Labs:  None     Assessment and Plan: Kamryn Segundo is a 29 year old female s/p Right ACL and PLC reconstruction on 1/28    Ortho Primary  Activity: ad pedro pablo. HKB locked in full extension; no Right knee motion   Weight bearing status: TTWB RLE  Pain management: PO as tolerated; tyl and ibu TID with oxy for breakthrough   Antibiotics: none   Diet: Begin with clear fluids and progress diet as tolerated.   DVT prophylaxis: ambulation   Imaging: none   Labs: none   Bracing/Splinting: HKB locked in full ext.  Dressings: Keep in place for 3 days.  Physical Therapy/Occupational Therapy: None inpatient .  Consults: None  Follow-up: Clinic with Dr. Silva on 2/5  Disposition: Home today     Patient discussed with Dr. Shira Partida MD   Orthopedic Surgery; PGY-5  Pager: [phone]    For questions about this patient during weekday business hours, please attempt to contact me at my pager prior to contacting the Orthopaedic Surgery resident on call. On the weekends and overnight, please page the Orthopaedic Surgery resident on call. Thank you!    "

## 2020-01-29 NOTE — TELEPHONE ENCOUNTER
[facility] Call Center    Phone Message    May a detailed message be left on voicemail: yes    Reason for Call: Other: Patient is calling in and stated she had surgery yesterday and she got a nerve block and her right leg is still numb on the bottom and she would like to know how long it will be numb for or when she should worry. It has been 24hours since surgery and she is still numb if someone can call the patient to discuss asap. Thank you.     Action Taken: Message routed to:  [facility] ([facility] ortho

## 2020-01-29 NOTE — OR NURSING
"Wakens easily c/o increasing discomfort \"behind right knee\"  Readily sleeps  resp 8/m  Observe    "

## 2020-01-29 NOTE — OR NURSING
PACU to Inpatient Nursing Handoff    Patient Kamryn Segundo is a 29 year old female who speaks English.   Procedure Procedure(s):  Examination Under Anesthesia Right Knee, Right Knee Arthroscopy, ACL Reconstruction with Hamstring Autograft,  Right Posterior Lateral Corner Reconstruction With Allograft, Biceps Femoris Repair, Peroneal Nerve Neural Lysis   Surgeon(s) Primary: Rodrigo Silva MD  Resident - Assisting: Angel Partida MD     No Known Allergies    Isolation  [unfilled]     Past Medical History   has a past medical history of Pollen allergy and Temporary low platelet count (H).    Anesthesia General   Dermatome Level     Preop Meds acetaminophen (Tylenol) - time given: 1217  gabapentin (Neurontin) - time given: 1217   Nerve block Adductor canal.  Location:right. Med:bupivacaine and epinephrine. Time given: 1658   Intraop Meds dexamethasone (Decadron)  fentanyl (Sublimaze): 50 mcg total  hydromorphone (Dilaudid): .5 mg total  ondansetron (Zofran): last given at 1653   Local Meds Yes   Antibiotics cefazolin (Ancef) - last given at 1555     Pain Patient Currently in Pain: sleeping: patient not able to self report  Comfort: tolerable with discomfort  Pain Control: partially effective   PACU meds  acetaminophen (Tylenol): 650 mg (total dose) last given at 1938   fentanyl (Sublimaze): 50 mcg (total dose) last given at 1900   hydromorphone (Dilaudid): 1.0 mg (total dose) last given at 1925    PCA / epidural No      Capnography Respiratory Monitoring (EtCO2): 33 mmHg  Integrated Pulmonary Index (IPI): 7   Telemetry ECG Rhythm: Sinus rhythm   Inpatient Telemetry Monitor Ordered? No        Labs Glucose Lab Results   Component Value Date    GLC 86 01/13/2020       Hgb Lab Results   Component Value Date    HGB 11.8 01/13/2020       INR No results found for: INR   PACU Imaging Not applicable     Wound/Incision Incision/Surgical Site 01/28/20 Right Knee (Active)   Incision Assessment UTV  1/28/2020  5:40 PM   Closure Adhesive strip(s);Sutures 1/28/2020  5:40 PM   Dressing Intervention Clean, dry, intact 1/28/2020  8:00 PM   Number of days: 0      CMS        Equipment ice pack   Other LDA       IV Access Peripheral IV 01/28/20 Left Hand (Active)   Site Assessment WDL 1/28/2020  7:34 PM   Line Status Infusing 1/28/2020  7:34 PM   Phlebitis Scale 0-->no symptoms 1/28/2020  7:34 PM   Number of days: 0      Blood Products Not applicable    mL   Intake/Output Date 01/28/20 0700 - 01/29/20 0659   Shift 8006-1001 2526-3850 9245-6819 24 Hour Total   INTAKE   P.O.  150  150   I.V.  500  500   Shift Total(mL/kg)  650(11.28)  650(11.28)   OUTPUT   Blood  100  100   Shift Total(mL/kg)  100(1.74)  100(1.74)   Weight (kg) 57.6 57.6 57.6 57.6      Drains / Hsu     Time of void PreOp Void Prior to Procedure: 1145 (01/28/20 1213)    PostOp      Diapered? No   Bladder Scan      mL (01/28/20 1745)  tolerating sips     Vitals    B/P: 113/71  T: 98.2  F (36.8  C)    Temp src: Oral  P:  Pulse: 59 (01/28/20 2000)    Heart Rate: 58 (01/28/20 2000)     R: 12  O2:  SpO2: 98 %    O2 Device: Nasal cannula (01/28/20 2000)    Oxygen Delivery: 2 LPM (01/28/20 2000)         Family/support present friend   Patient belongings     Patient transported on cart   DC meds/scripts (obs/outpt) Yes, meds   Inpatient Pain Meds Released? Yes       Special needs/considerations None   Tasks needing completion None       Pia Bhatia RN  ASCOM [id number]

## 2020-01-29 NOTE — DISCHARGE SUMMARY
ORTHOPAEDIC SURGERY DISCHARGE SUMMARY     Date of Admission: 1/28/2020  Date of Discharge: 1/29/2020  Disposition: Home  Staff Physician: Rodrigo Silva*  Primary Care Provider: Juany Saunders DIAGNOSIS:  Chronic pain of right knee [M25.561, G89.29]    PROCEDURES: Procedure(s):  Examination Under Anesthesia Right Knee, Right Knee Arthroscopy, ACL Reconstruction with Hamstring Autograft,  Right Posterior Lateral Corner Reconstruction With Allograft, Biceps Femoris Repair, Peroneal Nerve Neural Lysis on 1/28/2020    BRIEF HISTORY:  29-year-old female who state a multi-ligament knee injury in December.  Imaging revealed ACL tear and posterior lateral corner tears.  Risk benefits and alternatives to fixation and reconstruction were reviewed with the patient she elected to proceed.    HOSPITAL COURSE:    Patient was planned for outpatient surgery, however, she had pain postoperatively which necessitated mission for observation.. Kamryn Segundo did well post-operatively. The patient received routine nursing cares and at the time of discharge was medically stable. Vital signs were stable throughout admission. The patient is tolerating a regular diet and is voiding spontaneously.  She mobilized well independently. Pain is now controlled on oral medications which will be available on discharge. Stool softeners have been used while taking pain medications to help prevent constipation. Kamryn Segundo is deemed medically safe to discharge.     Antibiotics:  Ancef given periop and 24 hours postop.   DVT prophylaxis:  Ambulation, no chemical DVT prophylaxis  PT Progress:  No PT inpatient  Pain Meds:  Orals  Inpatient Events: No significant events or complications.   Follow up:   Follow up with Dr. Silva on 2/5/2020 post operatively    Future Appointments   Date Time Provider Department Center   2/5/2020  8:30 AM Rodrigo Silva MD [facility]   2/5/2020  9:30 AM Sherri Sheets, S[facility]   2/7/2020  1:20 PM Sherri Sheets, [facility]       Orthopaedic Surgery appointments are at the [facility] [place] ([address] [place] [address]). Call [phone] to schedule a follow-up appointment at this location with your provider.     PHYSICAL EXAM:    Please see progress note from the day of discharge.   Pertinent findings include: Peroneal nerve motor and sensation intact    Pending studies:   None      PLANNED DISCHARGE ORDERS:      Current Discharge Medication List      START taking these medications    Details   acetaminophen (TYLENOL) 325 MG tablet Take 3 tablets (975 mg) by mouth every 4 hours  Qty: 120 tablet, Refills: 0    Associated Diagnoses: Rupture of anterior cruciate ligament of right knee, subsequent encounter      ondansetron (ZOFRAN-ODT) 4 MG ODT tab Take 1-2 tablets (4-8 mg) by mouth every 8 hours as needed for nausea  Qty: 4 tablet, Refills: 0    Associated Diagnoses: Rupture of anterior cruciate ligament of right knee, subsequent encounter      oxyCODONE (ROXICODONE) 5 MG tablet Take 1-2 tablets (5-10 mg) by mouth every 4 hours as needed for moderate to severe pain  Qty: 30 tablet, Refills: 0    Associated Diagnoses: Rupture of anterior cruciate ligament of right knee, subsequent encounter      senna-docusate (SENOKOT-S/PERICOLACE) 8.6-50 MG tablet Take 1-2 tablets by mouth 2 times daily  Qty: 24 tablet, Refills: 0    Associated Diagnoses: Rupture of anterior cruciate ligament of right knee, subsequent encounter         CONTINUE these medications which have CHANGED    Details   ibuprofen (ADVIL/MOTRIN) 600 MG tablet Take 1 tablet (600 mg) by mouth every 8 hours as needed  Qty: 40 tablet, Refills: 0    Associated Diagnoses: Rupture of anterior cruciate ligament of right knee, subsequent encounter         STOP taking these medications       HYDROcodone-acetaminophen (NORCO) 5-325 MG tablet Comments:   Reason for Stopping:                  Discharge Procedure Orders   Discharge Instructions   Order Comments: COMPLEX MULTI LIGAMENT RECONSTRUCTION POST OPERATIVE INSTRUCTIONS     TTWB WITH HINGED KNEE BRACE PROTOCOL      FOLLOW UP APPOINTMENT  A follow-up appointment with Dr. Silva should be scheduled approximately one to two weeks after surgery. If your appointment was not scheduled prior to surgery, please call [phone].    Your follow up appointment will be at the location that you regularly see Dr. Silva:    [facility]  [address] [place], [place] [address] [phone]    [address] [id number] [phone]    Physical therapy:   Physical therapy should begin one week after surgery. An order for physical therapy will be provided by Dr. Silva's office but it will be your responsibility to schedule the first appointment at the location of your choice.     ACTIVITY  Weight bearing status:   You will be allowed to toe touch weight bear on your operative leg using assistive devices (crutches) as needed. The hinged knee brace should remain on and locked at 0 degrees at all times when up.     Hinged knee brace:  The brace should be set at 0 degrees to 90 degrees. It is to be locked at 0 degrees at all times for the first week after surgery. After one week, you may unlock the brace to begin range of motion from 0-90 degrees when safely seated. The brace should be on and locked at 0 degrees at all other times. Sleep with the brace on until directed.    Exercises:   Perform the following exercises at least three times per day for the first four weeks after surgery to prevent complications, such as blood clots in your legs:  1) Point and flex your feet  2) Move your ankle around in big circles  3) Wiggle your toes   Also, perform thigh muscle tightening exercises for 10 to 15 minutes at least three times per  day for the first four weeks after surgery.    Athletic Activities:  Activities such as swimming, bicycling, jogging, running, and stop-and-go sports should be avoided until permitted by your provider.    Driving:  Driving is not permitted until directed by your provider. Typically, driving is restricted for three to four weeks after right knee surgery and three weeks after left knee surgery. Under no circumstance are you permitted to drive while using narcotic pain medications.    Return to Work:  Return to work as soon as possible.  Your ability to work depends on a number of factors - your level of discomfort and how much demand your job puts on your knees.  If you have any questions, please call Dr. Silva's office.      COMFORT AND PAIN MANAGEMENT  Elevation:   During times of inactivity throughout the first two weeks after surgery, make an effort to decrease swelling by elevating your operative extremity. This is most effectively done by lying down and placing several pillows lengthwise under your thigh and calf to raise your toes above the level of your nose. To ensure that your knee remains in full extension, do not place pillows directly under your knee.     Icing:  An ice pack will be provided to control swelling and discomfort after surgery. Place a thin towel on your skin and apply the ice pack overtop. You may apply ice for 20 minutes as often as two times per hour.    Pain Medications:  You will be discharged with acetaminophen (Tylenol) and a narcotic medication for pain management after surgery. Acetaminophen is most effective when it is taken per the schedule outlined by your provider (every four, six, or eight hours as prescribed). You may safely use acetaminophen as prescribed for the first four weeks after surgery provided you do not exceed the maximum daily dose prescribed by your provider (usually 3000 mg - 4000 mg). The narcotic pain medication should only be taken on an as-needed basis when  necessary and should be reserved for severe pain that is not controlled with scheduled acetaminophen. In the first three days following surgery, your symptoms may warrant use of the narcotic pain medication every three, four, or six hours as prescribed. After three days, focus your efforts on decreasing (tapering) use of narcotic medications.   The most successful tapering strategy is to first, decrease the dose (number of tablets) and second, decrease the interval (time in between doses). For example, if you begin taking two tablets every four hours after surgery, start your taper by decreasing one of these doses to one tablet. Every one to two days, decrease another dose to one tablet until you are eventually taking one tablet every four hours. Once this is achieved, focus on increasing the number of hours between doses, moving from one tablet every four hours to one tablet every six hours. As tolerated, continue to increase the interval to eight and twelve hours. Eventually, taper to one dose every evening and discontinue when no longer needed.      ANTICOAGULATION  Depending on your risk factors, your provider may prescribe aspirin to prevent blood clots. If prescribed, take aspirin daily for the first four weeks after surgery.      WOUND CARE AND SHOWERING  Wound care:  Keep the initial post-op dressing on, clean, and dry for the first three days after surgery. On the fourth day after surgery, you may remove the dressing. Your surgical incisions were closed with steristrips (small white tape that is directly on the incision areas) that should be left on until they fall off or are removed at your first office visit. All sutures will also be removed at your first office visit. Under no circumstance should you pick or scratch your incision.    Showering:  You may shower on the fourth day after surgery (immediatly after the dressing is removed) provided your incision is intact and dry without drainage. If there is  fluid at the incision site, cover the incision with a non-permeable plastic bag. You may allow water to run over the incision, but do not soak or submerge the incision. Do not scrub the incision.     Tub Bathing:  Tub bathing, swimming, or any other activities that cause your incision to be submerged should be avoided until allowed by your provider. Typically, patients are allowed to return to these activities four weeks after surgery.      CONTACTING YOUR PHYSICIAN:  You may experience symptoms that require follow-up before your scheduled appointment. Please contact Dr. Silva's office if you experience:  1) Pain in your knee that persists or worsens in the first few days after surgery  2) Excessive redness or drainage of cloudy or bloody material from the wounds (clear red tinted fluid and some mild drainage should be expected) or drainage of any kind five days after surgery  3) A temperature elevation greater than 101.5 F   4) Pain, swelling or redness in your calf  5) Numbness or weakness in your leg or foot      Regular business hours (Monday - Friday, 8am - 5pm):  [facility]: [phone]  [facility]: [phone]    After hours and weekends:  [facility] on call Orthopedic resident: [phone]      Diet as Tolerated   Order Comments: Return to diet before surgery, unless instructed otherwise.     Weight bearing status - Toe touch   Order Comments: Toe touch weight bearing, hinged knee brace is to remain on and locked at 0 degrees while up     No driving or operating machinery    Order Comments: until the day after procedure     No Alcohol   Order Comments: For 24 hours post procedure     Ice to affected area   Order Comments: Ice pack to surgical site every 15 minutes per hour for 24 hours     Remove dressing - at 72 hours      Ice to affected area   Order Comments: Ice to operative site, as needed.      Discharge Instructions   Order Comments: Follow up appointment as instructed by Provider and/or Nurse.     Dressing   Order Comments: Keep dressing clean and dry.  Dressing change and shower 3rd day after surgery     Reason for your hospital stay   Order Comments: Right knee surgery     Activity   Order Comments: Your activity upon discharge: as tolerated with crutches; hinged knee brace locked in full extension     Order Specific Question Answer Comments   Is discharge order? Yes      Wound care and dressings   Order Comments: Instructions to care for your wound at home: change the dressing 3 days after surgery, then ok to shower     Adult [facility] Follow-up and recommended labs and tests   Order Comments: You have follow up scheduled at 830 on 2/5 with Dr. Silva, then you will see PT     Diet   Order Comments: Follow this diet upon discharge: Regular     Order Specific Question Answer Comments   Is discharge order? Yes          Angel Partida MD  Orthopedic Surgery, PGY-5  Pager: [phone]

## 2020-01-29 NOTE — PLAN OF CARE
VS: VSS, A&O X 4, pt denies CP or SOB.   O2: Room air sat > 90%.   Output: Voiding adequate amount without difficulty.    Last BM: 01/28/20.   Activity: Up to bathroom with crutches and SBA.    Skin: Intact except surgical incision.   Pain: Comfortably manageable with PRN pain medication.    CMS: CMS and neuro intact to baseline.    Dressing: CDI.   Diet: Regular tolerating without N/V.   LDA: PIV SL.    Equipment: Crutches, IV pole, and personal belongings.    Plan: Discharge to home this evening.   Additional Info:

## 2020-01-29 NOTE — PROGRESS NOTES
Discussed patient with PACU nurse. Pain control inadequate with orals, patient tearful. Then patient sedated after low dose IV. Recommending admission, agree; obs orders placed. Oxycodone rx to go with patient to be filled tomorrow if not already filled.

## 2020-01-29 NOTE — DISCHARGE INSTRUCTIONS
Same-Day Surgery   Adult Discharge Orders & Instructions     For 24 hours after surgery:  1. Get plenty of rest.  A responsible adult must stay with you for at least 24 hours after you leave the hospital.   2. Pain medication can slow your reflexes. Do not drive or use heavy equipment.  If you have weakness or tingling, don't drive or use heavy equipment until this feeling goes away.  3. Mixing alcohol and pain medication can cause dizziness and slow your breathing. It can even be fatal. Do not drink alcohol while taking pain medication.  4. Avoid strenuous or risky activities.  Ask for help when climbing stairs.   5. You may feel lightheaded.  If so, sit for a few minutes before standing.  Have someone help you get up.   6. If you have nausea (feel sick to your stomach), drink only clear liquids such as apple juice, ginger ale, broth or 7-Up.  Rest may also help.  Be sure to drink enough fluids.  Move to a regular diet as you feel able. Take pain medications with a small amount of solid food, such as toast or crackers, to avoid nausea.   7. A slight fever is normal. Call the doctor if your fever is over 100 F (37.7 C) (taken under the tongue) or lasts longer than 24 hours.  8. You may have a dry mouth, muscle aches, trouble sleeping or a sore throat.  These symptoms should go away after 24 hours.  9. Do not make important or legal decisions.   Pain Management:      1. Take pain medication (if prescribed) for pain as directed by your physician.        2. WARNING: If the pain medication you have been prescribed contains Tylenol  (acetaminophen), DO NOT take additional doses of Tylenol (acetaminophen).     Call your doctor for any of the followin.  Signs of infection (fever, growing tenderness at the surgery site, severe pain, a large amount of drainage or bleeding, foul-smelling drainage, redness, swelling).    2.  It has been over 8 to 10 hours since surgery and you are still not able to urinate (pee).    3.   Headache for over 24 hours.    4.  Numbness, tingling or weakness the day after surgery (if you had spinal anesthesia).  To contact a doctor, call _____________________________________ or:      [phone] and ask for the Resident On Call for:          __________________________________________ (answered 24 hours a day)      Emergency Department:  [facility]: [phone]  [place] [facility]: [phone]               Rev. 10/2014

## 2020-01-29 NOTE — PLAN OF CARE
Pt arrived to 5a at 2130. Pt VSS. LS clear, on RA. BS active, LBM on 1/28/2020. Voiding well. Pain managed with 5mg oxycodone. RLE is surgical dressing is c/d/I, hinged brace on. L PIV is patent and SL. Continue to monitor.

## 2020-01-29 NOTE — PLAN OF CARE
Pt discharged home with family, discharge medication, discharge instruction and dressing supplies given, all belongings sent with pt and pt understand discharge plan.

## 2020-01-30 NOTE — TELEPHONE ENCOUNTER
Called pt and left VM that it is normal to still be numb. I informed her to expect to be numb for about 2 weeks, this is not something to worry about, it is normal after ACL surgery.

## 2020-02-04 DIAGNOSIS — Z98.890 S/P RIGHT KNEE SURGERY: Primary | ICD-10-CM

## 2020-02-05 ENCOUNTER — THERAPY VISIT (OUTPATIENT)
Dept: PHYSICAL THERAPY | Facility: CLINIC | Age: 30
End: 2020-02-05
Payer: COMMERCIAL

## 2020-02-05 ENCOUNTER — ANCILLARY PROCEDURE (OUTPATIENT)
Dept: GENERAL RADIOLOGY | Facility: CLINIC | Age: 30
End: 2020-02-05
Attending: ORTHOPAEDIC SURGERY
Payer: COMMERCIAL

## 2020-02-05 ENCOUNTER — OFFICE VISIT (OUTPATIENT)
Dept: ORTHOPEDICS | Facility: CLINIC | Age: 30
End: 2020-02-05
Payer: COMMERCIAL

## 2020-02-05 DIAGNOSIS — Z98.890 S/P RIGHT KNEE SURGERY: Primary | ICD-10-CM

## 2020-02-05 DIAGNOSIS — Z98.890 S/P RIGHT KNEE SURGERY: ICD-10-CM

## 2020-02-05 DIAGNOSIS — S83.519A ACL (ANTERIOR CRUCIATE LIGAMENT) TEAR: Primary | ICD-10-CM

## 2020-02-05 PROBLEM — S89.90XA INJURY OF POSTEROLATERAL CORNER OF KNEE: Status: RESOLVED | Noted: 2020-01-13 | Resolved: 2020-02-05

## 2020-02-05 PROBLEM — R60.9 EDEMA: Status: RESOLVED | Noted: 2020-01-13 | Resolved: 2020-02-05

## 2020-02-05 PROCEDURE — 97161 PT EVAL LOW COMPLEX 20 MIN: CPT | Mod: GP | Performed by: PHYSICAL THERAPIST

## 2020-02-05 PROCEDURE — 97140 MANUAL THERAPY 1/> REGIONS: CPT | Mod: GP | Performed by: PHYSICAL THERAPIST

## 2020-02-05 PROCEDURE — 97110 THERAPEUTIC EXERCISES: CPT | Mod: GP | Performed by: PHYSICAL THERAPIST

## 2020-02-05 ASSESSMENT — ACTIVITIES OF DAILY LIVING (ADL)
WALK: ACTIVITY IS FAIRLY DIFFICULT
GIVING WAY, BUCKLING OR SHIFTING OF KNEE: THE SYMPTOM AFFECTS MY ACTIVITY MODERATELY
SWELLING: I HAVE THE SYMPTOM BUT IT DOES NOT AFFECT MY ACTIVITY
STAND: ACTIVITY IS SOMEWHAT DIFFICULT
KNEE_ACTIVITY_OF_DAILY_LIVING_SUM: 30
KNEEL ON THE FRONT OF YOUR KNEE: I AM UNABLE TO DO THE ACTIVITY
AS_A_RESULT_OF_YOUR_KNEE_INJURY,_HOW_WOULD_YOU_RATE_YOUR_CURRENT_LEVEL_OF_DAILY_ACTIVITY?: ABNORMAL
PAIN: THE SYMPTOM AFFECTS MY ACTIVITY SLIGHTLY
RAW_SCORE: 30
WEAKNESS: THE SYMPTOM AFFECTS MY ACTIVITY SLIGHTLY
SIT WITH YOUR KNEE BENT: ACTIVITY IS FAIRLY DIFFICULT
SQUAT: ACTIVITY IS VERY DIFFICULT
HOW_WOULD_YOU_RATE_THE_OVERALL_FUNCTION_OF_YOUR_KNEE_DURING_YOUR_USUAL_DAILY_ACTIVITIES?: ABNORMAL
GO DOWN STAIRS: ACTIVITY IS FAIRLY DIFFICULT
LIMPING: THE SYMPTOM AFFECTS MY ACTIVITY MODERATELY
GO UP STAIRS: ACTIVITY IS FAIRLY DIFFICULT
RISE FROM A CHAIR: ACTIVITY IS SOMEWHAT DIFFICULT
STIFFNESS: THE SYMPTOM AFFECTS MY ACTIVITY SEVERELY
KNEE_ACTIVITY_OF_DAILY_LIVING_SCORE: 42.86
HOW_WOULD_YOU_RATE_THE_CURRENT_FUNCTION_OF_YOUR_KNEE_DURING_YOUR_USUAL_DAILY_ACTIVITIES_ON_A_SCALE_FROM_0_TO_100_WITH_100_BEING_YOUR_LEVEL_OF_KNEE_FUNCTION_PRIOR_TO_YOUR_INJURY_AND_0_BEING_THE_INABILITY_TO_PERFORM_ANY_OF_YOUR_USUAL_DAILY_ACTIVITIES?: 30

## 2020-02-05 NOTE — LETTER
2/5/2020    RE: Kamryn Segundo  [address]  [facility] [address]     DIAGNOSIS:   1. Multi-ligament knee injury  1. ACL tear  2. Collateral ligament tear  3. Biceps femoris avulsion  2. Intact peroneal nerve    PROCEDURES:  1. ACL reconstruction hamstring autograft posterior lateral corner reconstruction with allograft peroneal nerve neural lysis date of surgery 1/28/2020    HISTORY:  Doing well.  Pain is a 3.  Weaning from narcotics only taking at night.  Making good progress.  Starting therapy today.    EXAM:     General: Awake, Alert, and oriented. Articulates and communicates with a normal affect     Right lower Extremity:    Incisions well healed without evidence of infection    Normal post-operative effusion and ecchymosis    Range of motion and stability exam not performed    Neurovascularly intact    IMAGING:  AP lateral radiographs of tunnels and hardware in good position.  I do think the cortical button the femoral side is intraosseous.    ASSESSMENT:  1. 1 week following multi-ligament knee reconstruction    PLAN:     Weightbearing and range of motion per our protocol    Physical therapy to start today    Sutures removed in clinic    Leave steri-strips in place until they fall off    OK to shower allowing water to run over incision    No soaking, scrubbing, baths, or lake for 1 additional week    Continue PT as scheduled     Pain medications reviewed and no refills required.     Operative report provided and arthroscopic images reviewed    Follow up at 6 weeks from the date of surgery with no new X-Rays needed       Rodrigo Silva MD

## 2020-02-05 NOTE — PROGRESS NOTES
DIAGNOSIS:   1. Multi-ligament knee injury  1. ACL tear  2. Collateral ligament tear  3. Biceps femoris avulsion  2. Intact peroneal nerve    PROCEDURES:  1. ACL reconstruction hamstring autograft posterior lateral corner reconstruction with allograft peroneal nerve neural lysis date of surgery 1/28/2020    HISTORY:  Doing well.  Pain is a 3.  Weaning from narcotics only taking at night.  Making good progress.  Starting therapy today.    EXAM:     General: Awake, Alert, and oriented. Articulates and communicates with a normal affect     Right lower Extremity:    Incisions well healed without evidence of infection    Normal post-operative effusion and ecchymosis    Range of motion and stability exam not performed    Neurovascularly intact    IMAGING:  AP lateral radiographs of tunnels and hardware in good position.  I do think the cortical button the femoral side is intraosseous.    ASSESSMENT:  1. 1 week following multi-ligament knee reconstruction    PLAN:     Weightbearing and range of motion per our protocol    Physical therapy to start today    Sutures removed in clinic    Leave steri-strips in place until they fall off    OK to shower allowing water to run over incision    No soaking, scrubbing, baths, or lake for 1 additional week    Continue PT as scheduled     Pain medications reviewed and no refills required.     Operative report provided and arthroscopic images reviewed    Follow up at 6 weeks from the date of surgery with no new X-Rays needed

## 2020-02-05 NOTE — PROGRESS NOTES
[facility] Initial Evaluation  Subjective:  [facility]                  Physical Therapy Initial Evaluation: Subjective History  Date of Surgery: 1/28/20.    Surgical Procedure/Limb: s/p ACL-R, Biceps femoris repair, peroneal nerve neurolysis, LCL-R  Surgeon Name: Dr. Silva  Average Daily Pain Levels: 3/10 (Location: anterior; Quality: Aching/Throbbing, sharp)  Other Symptoms: swelling  Symptom Mgmt Strategies: icing, pain meds  Prior orthopaedic history/procedures: none  Prior non-operative management: PT  Next MD Appt Date: today    Functional limitations following procedure: ambulation  Previous level of function: no restrictions    Patient Employment: PhD student  Typical Physical Activities: climbing    Post-Operative Physical Therapy Examination    Physical Mobility Status  Gait: NWB with two axillary crutches  Transfers:  independent    Anthropometric Measures     Right Left Difference   Joint ROM      Hyperextension nt deg 3 deg na deg   Extension 0 deg 0 deg 0 deg   Flexion nt deg 145 deg na deg   Circumferential Measures      Joint Line 39 cm 35 cm 4 cm   15 cm Prox 44 cm 45 cm 1 cm   Effusion 2+ 0        Quadriceps Muscle Activation Right Left   Isometric Quad Activation Fair Normal   Straight Leg Raising Extensor lag of 5 deg No extensor lag     Status of Incision: Clean & healing    Assessment/Plan  Patient has been enrolled in the [employer] Trial into the delayed rehabilitation group.    Patient is a 29 year old female with right side knee complaints.    Patient has the following significant findings with corresponding treatment plan.                Diagnosis 1:  S/p Multiligamentous Reconstruction Knee  Pain -  hot/cold therapy, US, electric stimulation, manual therapy, splint/taping/bracing/orthotics, self management, education and home program  Decreased ROM/flexibility - manual therapy and therapeutic exercise  Decreased joint mobility - manual therapy and therapeutic  exercise  Decreased strength - therapeutic exercise and therapeutic activities  Impaired balance - neuro re-education and therapeutic activities  Decreased proprioception - neuro re-education and therapeutic activities  Inflammation - cold therapy  Edema - vasopneumatics  Impaired gait - gait training  Impaired muscle performance - neuro re-education  Decreased function - therapeutic activities    Therapy Evaluation Codes:   1) History comprised of:   Personal factors that impact the plan of care:      None.    Comorbidity factors that impact the plan of care are:      None.     Medications impacting care: None.  2) Examination of Body Systems comprised of:   Body structures and functions that impact the plan of care:      Knee.   Activity limitations that impact the plan of care are:      Sitting, Sports, Squatting/kneeling, Stairs, Standing, Walking, Working, Sleeping and Laying down.  3) Clinical presentation characteristics are:   Stable/Uncomplicated.  4) Decision-Making    Low complexity using standardized patient assessment instrument and/or measureable assessment of functional outcome.  Cumulative Therapy Evaluation is: Low complexity.    Previous and current functional limitations:  (See Goal Flow Sheet for this information)    Short term and Long term goals: (See Goal Flow Sheet for this information)     Communication ability:  Patient appears to be able to clearly communicate and understand verbal and written communication and follow directions correctly.  Treatment Explanation - The following has been discussed with the patient:   RX ordered/plan of care  Anticipated outcomes  Possible risks and side effects  This patient would benefit from PT intervention to resume normal activities.   Rehab potential is good.    Frequency:  2 X week, once daily  Duration:  for 6 weeks tapering to 1 X a week over 6 weeks tapering to 2 x month for 6 months  Discharge Plan:  Achieve all LTG.  Independent in home treatment  program.  Reach maximal therapeutic benefit.    Please refer to the daily flowsheet for treatment today, total treatment time and time spent performing 1:1 timed codes.       Objective:  System    Physical Exam    General     ROS

## 2020-02-07 ENCOUNTER — THERAPY VISIT (OUTPATIENT)
Dept: PHYSICAL THERAPY | Facility: CLINIC | Age: 30
End: 2020-02-07
Payer: COMMERCIAL

## 2020-02-07 DIAGNOSIS — Z47.89 AFTERCARE FOLLOWING SURGERY OF THE MUSCULOSKELETAL SYSTEM: ICD-10-CM

## 2020-02-07 DIAGNOSIS — S89.90XA: ICD-10-CM

## 2020-02-07 DIAGNOSIS — S83.519A ACL (ANTERIOR CRUCIATE LIGAMENT) TEAR: Primary | ICD-10-CM

## 2020-02-07 PROCEDURE — 97110 THERAPEUTIC EXERCISES: CPT | Mod: GP | Performed by: PHYSICAL THERAPIST

## 2020-02-07 PROCEDURE — 97016 VASOPNEUMATIC DEVICE THERAPY: CPT | Mod: GP | Performed by: PHYSICAL THERAPIST

## 2020-02-07 PROCEDURE — 97140 MANUAL THERAPY 1/> REGIONS: CPT | Mod: GP | Performed by: PHYSICAL THERAPIST

## 2020-02-12 ENCOUNTER — THERAPY VISIT (OUTPATIENT)
Dept: PHYSICAL THERAPY | Facility: CLINIC | Age: 30
End: 2020-02-12
Payer: COMMERCIAL

## 2020-02-12 DIAGNOSIS — Z47.89 AFTERCARE FOLLOWING SURGERY OF THE MUSCULOSKELETAL SYSTEM: ICD-10-CM

## 2020-02-12 DIAGNOSIS — R60.9 EDEMA: ICD-10-CM

## 2020-02-12 DIAGNOSIS — S89.90XA: ICD-10-CM

## 2020-02-12 DIAGNOSIS — S83.519A ACL (ANTERIOR CRUCIATE LIGAMENT) TEAR: Primary | ICD-10-CM

## 2020-02-12 DIAGNOSIS — M25.561 CHRONIC PAIN OF RIGHT KNEE: ICD-10-CM

## 2020-02-12 DIAGNOSIS — G89.29 CHRONIC PAIN OF RIGHT KNEE: ICD-10-CM

## 2020-02-12 PROCEDURE — 97016 VASOPNEUMATIC DEVICE THERAPY: CPT | Mod: GP | Performed by: PHYSICAL THERAPY ASSISTANT

## 2020-02-12 PROCEDURE — 97110 THERAPEUTIC EXERCISES: CPT | Mod: GP | Performed by: PHYSICAL THERAPY ASSISTANT

## 2020-02-12 PROCEDURE — 97140 MANUAL THERAPY 1/> REGIONS: CPT | Mod: GP | Performed by: PHYSICAL THERAPY ASSISTANT

## 2020-02-17 ENCOUNTER — TELEPHONE (OUTPATIENT)
Dept: ORTHOPEDICS | Facility: CLINIC | Age: 30
End: 2020-02-17

## 2020-02-17 NOTE — TELEPHONE ENCOUNTER
[facility] Call Center    Phone Message    May a detailed message be left on voicemail: yes     Reason for Call: Other:   Pt would like to speak to Shira's team about her current conditions.     1. Calf and back side of pt's knee is arched. Pts PT told her that pt can squeeze or massage it. When pt does that, it does feel better but it returns when pt stops.     2. On the back side of pt's knee is swollen and appears to have dark spots and purple. Pt noticed it a wk ago.     3. On Pt's right calf, Pt can feel restless symptoms. It only happens on the calf. This makes it difficult for pt to fall asleep.     4. In regards to Pt's nutrition, can she add addition calcium, vitamin, protein powder to pt's diet.     PKLEASE CALL BACK      Action Taken: Message routed to:  [facility] ([facility]): sports    Travel Screening: Not Applicable

## 2020-02-17 NOTE — TELEPHONE ENCOUNTER
Please communicate my responses to the patient. Lalitha, what are your thoughts?    JM      1. I am not exactly sure, what this is. I would like more information from Lalitha her PT.    2. I think it probably represents post surgical bruising. I would expect it is normal and it will improve.    3. Yes, this is a problem. It should improve with time.    4. Yes. All supplements are fine.

## 2020-02-18 NOTE — TELEPHONE ENCOUNTER
Returned call to patient, answering the questions that are listed below with Dr. Silva's answers. She will see SONG Doty, tomorrow who will also check her knee for any concerns. She had no further questions at this time.

## 2020-02-19 ENCOUNTER — THERAPY VISIT (OUTPATIENT)
Dept: PHYSICAL THERAPY | Facility: CLINIC | Age: 30
End: 2020-02-19
Payer: COMMERCIAL

## 2020-02-19 DIAGNOSIS — S83.519A ACL (ANTERIOR CRUCIATE LIGAMENT) TEAR: Primary | ICD-10-CM

## 2020-02-19 PROCEDURE — 97110 THERAPEUTIC EXERCISES: CPT | Mod: GP | Performed by: PHYSICAL THERAPIST

## 2020-02-19 PROCEDURE — 97530 THERAPEUTIC ACTIVITIES: CPT | Mod: GP | Performed by: PHYSICAL THERAPIST

## 2020-02-19 PROCEDURE — 97140 MANUAL THERAPY 1/> REGIONS: CPT | Mod: GP | Performed by: PHYSICAL THERAPIST

## 2020-02-19 PROCEDURE — 97016 VASOPNEUMATIC DEVICE THERAPY: CPT | Mod: GP | Performed by: PHYSICAL THERAPIST

## 2020-02-26 ENCOUNTER — THERAPY VISIT (OUTPATIENT)
Dept: PHYSICAL THERAPY | Facility: CLINIC | Age: 30
End: 2020-02-26
Payer: COMMERCIAL

## 2020-02-26 DIAGNOSIS — R60.9 EDEMA: ICD-10-CM

## 2020-02-26 DIAGNOSIS — R26.9 ABNORMAL GAIT: ICD-10-CM

## 2020-02-26 DIAGNOSIS — S83.519A ACL (ANTERIOR CRUCIATE LIGAMENT) TEAR: Primary | ICD-10-CM

## 2020-02-26 DIAGNOSIS — Z47.89 AFTERCARE FOLLOWING SURGERY OF THE MUSCULOSKELETAL SYSTEM: ICD-10-CM

## 2020-02-26 PROCEDURE — 97116 GAIT TRAINING THERAPY: CPT | Mod: GP | Performed by: PHYSICAL THERAPIST

## 2020-02-26 PROCEDURE — 97140 MANUAL THERAPY 1/> REGIONS: CPT | Mod: GP | Performed by: PHYSICAL THERAPIST

## 2020-02-26 PROCEDURE — 97110 THERAPEUTIC EXERCISES: CPT | Mod: GP | Performed by: PHYSICAL THERAPIST

## 2020-02-28 ENCOUNTER — THERAPY VISIT (OUTPATIENT)
Dept: PHYSICAL THERAPY | Facility: CLINIC | Age: 30
End: 2020-02-28
Payer: COMMERCIAL

## 2020-02-28 DIAGNOSIS — S83.519A ACL (ANTERIOR CRUCIATE LIGAMENT) TEAR: Primary | ICD-10-CM

## 2020-02-28 DIAGNOSIS — R60.9 EDEMA: ICD-10-CM

## 2020-02-28 DIAGNOSIS — Z47.89 AFTERCARE FOLLOWING SURGERY OF THE MUSCULOSKELETAL SYSTEM: ICD-10-CM

## 2020-02-28 PROCEDURE — 97016 VASOPNEUMATIC DEVICE THERAPY: CPT | Mod: GP | Performed by: PHYSICAL THERAPIST

## 2020-02-28 PROCEDURE — 97140 MANUAL THERAPY 1/> REGIONS: CPT | Mod: GP | Performed by: PHYSICAL THERAPIST

## 2020-02-28 PROCEDURE — 97110 THERAPEUTIC EXERCISES: CPT | Mod: GP | Performed by: PHYSICAL THERAPIST

## 2020-03-04 ENCOUNTER — THERAPY VISIT (OUTPATIENT)
Dept: PHYSICAL THERAPY | Facility: CLINIC | Age: 30
End: 2020-03-04
Payer: COMMERCIAL

## 2020-03-04 DIAGNOSIS — R60.9 EDEMA: ICD-10-CM

## 2020-03-04 DIAGNOSIS — Z47.89 AFTERCARE FOLLOWING SURGERY OF THE MUSCULOSKELETAL SYSTEM: ICD-10-CM

## 2020-03-04 DIAGNOSIS — S89.90XA: ICD-10-CM

## 2020-03-04 DIAGNOSIS — R26.9 ABNORMAL GAIT: ICD-10-CM

## 2020-03-04 DIAGNOSIS — S83.519A ACL (ANTERIOR CRUCIATE LIGAMENT) TEAR: Primary | ICD-10-CM

## 2020-03-04 PROCEDURE — 97110 THERAPEUTIC EXERCISES: CPT | Mod: GP | Performed by: PHYSICAL THERAPY ASSISTANT

## 2020-03-04 PROCEDURE — 97016 VASOPNEUMATIC DEVICE THERAPY: CPT | Mod: GP | Performed by: PHYSICAL THERAPY ASSISTANT

## 2020-03-04 PROCEDURE — 97140 MANUAL THERAPY 1/> REGIONS: CPT | Mod: GP | Performed by: PHYSICAL THERAPY ASSISTANT

## 2020-03-06 ENCOUNTER — THERAPY VISIT (OUTPATIENT)
Dept: PHYSICAL THERAPY | Facility: CLINIC | Age: 30
End: 2020-03-06
Payer: COMMERCIAL

## 2020-03-06 DIAGNOSIS — S83.519A ACL (ANTERIOR CRUCIATE LIGAMENT) TEAR: Primary | ICD-10-CM

## 2020-03-06 DIAGNOSIS — Z47.89 AFTERCARE FOLLOWING SURGERY OF THE MUSCULOSKELETAL SYSTEM: ICD-10-CM

## 2020-03-06 PROCEDURE — 97112 NEUROMUSCULAR REEDUCATION: CPT | Mod: GP | Performed by: PHYSICAL THERAPIST

## 2020-03-06 PROCEDURE — 97140 MANUAL THERAPY 1/> REGIONS: CPT | Mod: GP | Performed by: PHYSICAL THERAPIST

## 2020-03-06 PROCEDURE — 97110 THERAPEUTIC EXERCISES: CPT | Mod: GP | Performed by: PHYSICAL THERAPIST

## 2020-03-06 NOTE — PROGRESS NOTES
PROGRESS  REPORT    Progress reporting period is from 2/5/20 to 3/6/20.       SUBJECTIVE  Subjective: Patient states that her knee is progressing, not feeling as stiff.  Working hard on her home program and keeping a diligent log.    Changes in function:  Yes (See Goal flowsheet attached for changes in current functional level)  Adverse reaction to treatment or activity: None    OBJECTIVE  Changes noted in objective findings:  Yes  Objective: R knee ROM: 0-0-62.  SLR without extensor lag.  1+ effusion.  Opened brace with quad facilitation from 0-50.     ASSESSMENT/PLAN  Updated problem list and treatment plan: Diagnosis 1:  S/p ACL-R, FCL-R, biceps femoris repair    Pain -  hot/cold therapy, US, electric stimulation, manual therapy, splint/taping/bracing/orthotics, self management, education and home program  Decreased ROM/flexibility - manual therapy and therapeutic exercise  Decreased joint mobility - manual therapy and therapeutic exercise  Decreased strength - therapeutic exercise and therapeutic activities  Impaired balance - neuro re-education and therapeutic activities  Decreased proprioception - neuro re-education and therapeutic activities  Inflammation - cold therapy  Edema - vasopneumatics  Impaired gait - gait training  Impaired muscle performance - neuro re-education  Decreased function - therapeutic activities  STG/LTGs have been met or progress has been made towards goals:  Yes (See Goal flow sheet completed today.)  Assessment of Progress: The patient's condition is improving.  Self Management Plans:  Patient has been instructed in a home treatment program.  I have re-evaluated this patient and find that the nature, scope, duration and intensity of the therapy is appropriate for the medical condition of the patient.  Kamryn continues to require the following intervention to meet STG and LTG's:  PT    Recommendations:  This patient would benefit from continued therapy.     Frequency:  2 X week, once  daily  Duration:  for 4 weeks tapering to 1 X a week over 4 weeks tapering to 2 x month for 6 months        Please refer to the daily flowsheet for treatment today, total treatment time and time spent performing 1:1 timed codes.

## 2020-03-11 ENCOUNTER — THERAPY VISIT (OUTPATIENT)
Dept: PHYSICAL THERAPY | Facility: CLINIC | Age: 30
End: 2020-03-11
Payer: COMMERCIAL

## 2020-03-11 ENCOUNTER — HEALTH MAINTENANCE LETTER (OUTPATIENT)
Age: 30
End: 2020-03-11

## 2020-03-11 ENCOUNTER — OFFICE VISIT (OUTPATIENT)
Dept: ORTHOPEDICS | Facility: CLINIC | Age: 30
End: 2020-03-11
Payer: COMMERCIAL

## 2020-03-11 DIAGNOSIS — G89.29 CHRONIC PAIN OF RIGHT KNEE: ICD-10-CM

## 2020-03-11 DIAGNOSIS — R26.9 ABNORMAL GAIT: ICD-10-CM

## 2020-03-11 DIAGNOSIS — S89.90XA: ICD-10-CM

## 2020-03-11 DIAGNOSIS — M25.561 CHRONIC PAIN OF RIGHT KNEE: ICD-10-CM

## 2020-03-11 DIAGNOSIS — Z98.890 S/P RIGHT KNEE SURGERY: Primary | ICD-10-CM

## 2020-03-11 DIAGNOSIS — Z47.89 AFTERCARE FOLLOWING SURGERY OF THE MUSCULOSKELETAL SYSTEM: ICD-10-CM

## 2020-03-11 DIAGNOSIS — S83.519A ACL (ANTERIOR CRUCIATE LIGAMENT) TEAR: Primary | ICD-10-CM

## 2020-03-11 DIAGNOSIS — R60.9 EDEMA: ICD-10-CM

## 2020-03-11 PROCEDURE — 97110 THERAPEUTIC EXERCISES: CPT | Mod: GP | Performed by: PHYSICAL THERAPY ASSISTANT

## 2020-03-11 PROCEDURE — 97140 MANUAL THERAPY 1/> REGIONS: CPT | Mod: GP | Performed by: PHYSICAL THERAPY ASSISTANT

## 2020-03-11 PROCEDURE — 97530 THERAPEUTIC ACTIVITIES: CPT | Mod: GP | Performed by: PHYSICAL THERAPY ASSISTANT

## 2020-03-11 NOTE — LETTER
3/11/2020      RE: Kamryn Segundo  [address]  [facility] [id number]       DIAGNOSIS:   1. Multi-ligament knee injury  1. ACL tear  2. Fibular collateral ligament tear  3. Biceps femoris avulsion  2. Intact peroneal nerve    PROCEDURES:  1. ACL reconstruction hamstring autograft, posterior lateral corner reconstruction with allograft, peroneal nerve neural lysis date of surgery 1/28/2020    HISTORY:  Doing well.  Pain is a 3.  Has been in the conservative rehab protocol.  She feels like her rehab is going slow.  Her flexion is progressing however it is been progressing slowly.  Her knee is pretty stiff.    EXAM:     General: Awake, Alert, and oriented. Articulates and communicates with a normal affect     Right lower Extremity:    Incisions well healed without evidence of infection    No post-operative effusion or ecchymosis    Range of motion shows 0 degrees of full extension though no hyperextension versus 5 degrees of hyperextension the contralateral side.  She has flexion today to 72 degrees.  I think she is still short of 90.      Lachman 0, no pivot shift, stable to varus and valgus stress testing stable posterior drawer testing     Neurovascularly intact    IMAGING:  No new imaging    ASSESSMENT:  1. 6 weeks following multi-ligament knee reconstruction    PLAN:   Weightbearing: WBAT  Range of Motion: No range of motion restrictions she needs to continue to push her motion with therapy.  She is okay for bike okay for swim.  No running or jumping.  I want her to have at least 100 if not 110 to 115 degrees of flexion in 6 weeks or we will consider manipulation under anesthesia and arthroscopic lysis of adhesions  Pain Medications: We reviewed post-operative pain medications at today's visit. The patient has stopped all opioid pain medications and no further refills are required  Extension: We reviewed the importance of full knee extension and demonstrated the relevant exercises as  appropriate  Crutches/Brace: Patient no longer requires the hinged knee brace or crutches.   Acitivity Restrictions:  Discussed that this is the dangerous time after ACL reconstruction  Reviewed activity restrictions at today's visit  Goal to progress strength and motion to allow straight line running at three months from the date of surgery      Follow up: 6 weeks with no new radiographs needed          Rodrigo Silva MD

## 2020-03-11 NOTE — PROGRESS NOTES
DIAGNOSIS:   1. Multi-ligament knee injury  1. ACL tear  2. Fibular collateral ligament tear  3. Biceps femoris avulsion  2. Intact peroneal nerve    PROCEDURES:  1. ACL reconstruction hamstring autograft, posterior lateral corner reconstruction with allograft, peroneal nerve neural lysis date of surgery 1/28/2020    HISTORY:  Doing well.  Pain is a 3.  Has been in the conservative rehab protocol.  She feels like her rehab is going slow.  Her flexion is progressing however it is been progressing slowly.  Her knee is pretty stiff.    EXAM:     General: Awake, Alert, and oriented. Articulates and communicates with a normal affect     Right lower Extremity:    Incisions well healed without evidence of infection    No post-operative effusion or ecchymosis    Range of motion shows 0 degrees of full extension though no hyperextension versus 5 degrees of hyperextension the contralateral side.  She has flexion today to 72 degrees.  I think she is still short of 90.      Lachman 0, no pivot shift, stable to varus and valgus stress testing stable posterior drawer testing     Neurovascularly intact    IMAGING:  No new imaging    ASSESSMENT:  1. 6 weeks following multi-ligament knee reconstruction    PLAN:   Weightbearing: WBAT  Range of Motion: No range of motion restrictions she needs to continue to push her motion with therapy.  She is okay for bike okay for swim.  No running or jumping.  I want her to have at least 100 if not 110 to 115 degrees of flexion in 6 weeks or we will consider manipulation under anesthesia and arthroscopic lysis of adhesions  Pain Medications: We reviewed post-operative pain medications at today's visit. The patient has stopped all opioid pain medications and no further refills are required  Extension: We reviewed the importance of full knee extension and demonstrated the relevant exercises as appropriate  Crutches/Brace: Patient no longer requires the hinged knee brace or crutches.   Acitivity  Restrictions:  Discussed that this is the dangerous time after ACL reconstruction  Reviewed activity restrictions at today's visit  Goal to progress strength and motion to allow straight line running at three months from the date of surgery      Follow up: 6 weeks with no new radiographs needed

## 2020-03-11 NOTE — NURSING NOTE
Reason For Visit:   Chief Complaint   Patient presents with     Surgical Followup     DOS 1/28/20 Examination Under Anesthesia Right Knee, Right Knee Arthroscopy, ACL Reconstruction with Hamstring Autograft, Right Posterior Lateral Corner Reconstruction With Allograft, Biceps Femoris Repair, Peroneal Nerve Neural Lysis           Date of surgery: 1/28/20  Type of surgery:   PROCEDURE:  1. Examination under anesthesia right knee  2. Right knee arthroscopy  3. ACL reconstruction hamstring autograft  4. Fibular collateral ligament reconstruction semi-tendinosis allograft  5. Biceps femoris repair  6. Peroneal nerve neural lysis  7. Medial femoral condyle chondroplasty      Smoker: No  Request smoking cessation information: No          No Known Allergies    Current Outpatient Medications   Medication     acetaminophen (TYLENOL) 325 MG tablet     ibuprofen (ADVIL/MOTRIN) 600 MG tablet     ondansetron (ZOFRAN-ODT) 4 MG ODT tab     oxyCODONE (ROXICODONE) 5 MG tablet     senna-docusate (SENOKOT-S/PERICOLACE) 8.6-50 MG tablet     No current facility-administered medications for this visit.          Janell Peraza, ATC

## 2020-03-13 ENCOUNTER — TELEPHONE (OUTPATIENT)
Dept: PHYSICAL THERAPY | Facility: CLINIC | Age: 30
End: 2020-03-13

## 2020-03-13 NOTE — TELEPHONE ENCOUNTER
Discussed advancement of home program.  Patient will be not coming in for OP visits for a few weeks with the current status of the COVID-19 pandemic.  Here is her current home program:    Exercises   Supine Ankle Pumps - 10 reps - 2 sets - 2-3x daily - 7x weekly   Supine Gluteal Sets - 10 reps - 2 sets - 5 second hold - 2-3x daily - 7x weekly   Long Sitting Quad Set - 10 reps - 2 sets - 2-3x daily - 7x weekly   Long Sitting 4 Way Patellar [place] - 10 reps - 1 sets - 1x daily - 7x weekly   Wall Quarter Squat - 4-6 reps - 30-45 hold - 4x weekly   Quarter Squat with Table - 8-12 reps - 3-4 sets - 5 hold   Standing Terminal Knee Extension with Resistance - 8-12 reps - 3-4 sets - 5 hold   Prone Terminal Knee Extension - 8-12 reps - 3-4 sets - 5 hold   Standing Weight Shift - 12 reps - 3 sets   Step Up - 8-12 reps - 3-4 sets - 5 hold   Alternating Single Leg Balance - Foot Behind - 12 reps - 3 sets   Alternating Single Leg Balance - Foot in Front - 12 reps - 3 sets

## 2020-03-20 ENCOUNTER — TELEPHONE (OUTPATIENT)
Dept: PHYSICAL THERAPY | Facility: CLINIC | Age: 30
End: 2020-03-20

## 2020-03-20 NOTE — TELEPHONE ENCOUNTER
"The patient has been notified of following:     \"This virtual visit will be conducted between you and your provider. We have found that certain health care needs can be provided without the need for physical presence.  This service lets us provide the care you need with a short virtual visit.  At this time, you will not be charged for this visit as we do not have the processes in place to bill for virtual encounters.\"    Due to external, as well as internal [facility] management of COVID-19 Virus, Kamryn Segundo was not seen in our clinic.  As a substitution, we implemented a virtual visit to manage this patient's condition utilizing the [employer] virtual visit platform via the patient s existing code.  The PTRx platform uses a synchronous HIPAA compliant video stream for this patient encounter.            S: Patient connected virtually on the PTRx platform to discuss their condition/progress. They noted improvements in ROM, quadriceps strength.  They noted ongoing pain or limitations with pain in calf, HS weakness.        O: Patient demonstrated KF ROM to  degrees      A:   Patient is 8 weeks s/p multiligamentous knee reconstruction, making gains with functional mobility, ROM, and strength.     P: Patient will continue with the exercise program assigned on their PTRx code and will add the following measures to manage their pain/condition: step up, prone HS curls AG            Virtual visit contact time  Visit Started at 1205  Visit Ended at 1225  Total Time for documentation: 5 minutes    Procedure Code (For internal tracking only, please document any charges you would apply)  Therapeutic Exercise: 10 minutes  Therapeutic Activities: 10 minutes         I have reviewed the note as documented above.  This accurately captures the substance of my conversation with the patient.        "

## 2020-03-27 ENCOUNTER — THERAPY VISIT (OUTPATIENT)
Dept: PHYSICAL THERAPY | Facility: CLINIC | Age: 30
End: 2020-03-27
Payer: COMMERCIAL

## 2020-03-27 DIAGNOSIS — Z47.89 AFTERCARE FOLLOWING SURGERY OF THE MUSCULOSKELETAL SYSTEM: Primary | ICD-10-CM

## 2020-03-27 PROCEDURE — 99207 C NONPHYSICIAN TELEPHONE ASSESSMENT 11-20 MIN: CPT | Mod: GP | Performed by: PHYSICAL THERAPIST

## 2020-03-27 NOTE — PROGRESS NOTES
"Physical Therapy Virtual Follow Up      The patient has been notified of following:     \"This virtual visit will be conducted between you and your provider. We have found that certain health care needs can be provided without the need for physical presence.  This service lets us provide the care you need with a short virtual visit.\"    Due to external, as well as internal [facility] management of the COVID-19 Virus, Kamryn Segundo was not seen in our clinic.  As a substitution, we implemented a virtual visit to manage this patient's condition utilizing the [employer] virtual visit platform via the patient s existing code.  The provider, Sherri Sheets, reviewed the patient's chart, PTRx prescription, and spoke with the patient to determine the following telemedicine visit is appropriate and effective for the patient's care.    The following type of visit was completed:   Telephone Visit:  A telephone visit was used in conjunction with the online PTRx exercise platform.          S: Kamryn Segundo is a 30 year old female. Connected virtually on the PTRx platform with Kamryn Segundo to discuss their condition/progress. They noted improvements in function, ROM.  They noted ongoing pain or limitations with motion.     Current pain level: No change    O: Patient demonstrated increased ROM to 105-110.         A:   Patient is 9 weeks s/p multiligamentous reconstruction.       P: Patient will continue with the exercise program assigned on their PTRx code and will add the following measures to manage their pain/condition:    Access Code: [email or web address]   URL: [email or web address]   Date: 03/27/2020   Prepared by: Sherri Sheets     Exercises  Long Sitting Quad Set - 10 reps - 2 sets - 2-3x daily - 7x weekly  Long Sitting 4 Way Patellar [place] - 10 reps - 1 sets - 1x daily - 7x weekly  Wall Quarter Squat - 4-6 reps - 30-45 hold - 4x weekly  Quarter Squat with Table - 8-12 reps - 3-4 sets - 5 hold  Standing Terminal Knee " Extension with Resistance - 8-12 reps - 3-4 sets - 5 hold  Prone Terminal Knee Extension - 8-12 reps - 3-4 sets - 5 hold  Seated Long Arc Quad (90-45 Degree Range) - 8-12 reps - 3-4 sets - 5 hold  Step Up - 8-12 reps - 3-4 sets - 5 hold  Alternating Single Leg Balance - Foot Behind - 12 reps - 3 sets  Alternating Single Leg Balance - Foot in Front - 12 reps - 3 sets  Prone Terminal Knee Extension - 8-12 reps - 3-4 sets - 5 hold  Prone Knee Flexion Extension AROM - 8-12 reps - 3-4 sets - 5 hold  Standing Knee Flexion AROM - 8-12 reps - 3-4 sets - 5 hold  Supine Heel Slide - 8-12 reps - 3-4 sets - 5 hold  Supine Heel Slide with Strap - 8-12 reps - 3-4 sets - 5 hold       Virtual visit contact time    Time of service began: 240 PM  Time of service ended: 255 PM  Total Time for set up, visit, and documentation: 20 minutes    Procedure Code/s (For internal tracking only, please document any charges you would apply)  Therapeutic Exercise (26965): 10 minutes  Therapeutic Activities (86393): 5 minutes    I have reviewed the note as documented above.  This accurately captures the substance of my conversation with the patient.  Provider location: [place], [place] (City/State)  Patient location: [place], [place]  ___________________________________________________________________________________________________________________    Any subjective info about the quality of the visit, ease of use: easy  Patient's opinion about reasonable cost for this visit n/a

## 2020-04-03 ENCOUNTER — VIRTUAL VISIT (OUTPATIENT)
Dept: PHYSICAL THERAPY | Facility: CLINIC | Age: 30
End: 2020-04-03
Payer: COMMERCIAL

## 2020-04-03 DIAGNOSIS — Z47.89 AFTERCARE FOLLOWING SURGERY OF THE MUSCULOSKELETAL SYSTEM: Primary | ICD-10-CM

## 2020-04-03 PROCEDURE — 97530 THERAPEUTIC ACTIVITIES: CPT | Mod: 95 | Performed by: PHYSICAL THERAPIST

## 2020-04-03 NOTE — PROGRESS NOTES
"Physical Therapy Virtual Follow Up Visit      The patient has been notified of following:     \"This virtual visit will be conducted between you and your provider. We have found that certain health care needs can be provided without the need for physical presence.  This service lets us provide the care you need with a virtual visit.\"    Due to external, as well as internal [facility] management of the COVID-19 Virus, Kamryn Segundo was not seen in our clinic.  As a substitution, we implemented a virtual visit to manage this patient's condition utilizing the PTRx virtual visit platform via the patient s existing code.  The provider, Sherri Sheets, reviewed the patient's chart, PTRx prescription, and spoke with the patient to determine the following telemedicine visit is appropriate and effective for the patient's care.    The following type of visit was completed:   Video Visit:  The PTRx platform uses a synchronous HIPAA compliant video stream for this patient encounter.        S: Kamryn Segundo is a 30 year old female. Connected virtually on the PTRx platform to discuss their condition/progress. They noted improvements in ROM, strength, gait.  They noted ongoing pain or limitations with stiffness.     Current pain level: No change      O: Patient demonstrated , normalizing gait pattern,  SLR without an extensor leg.  DL squat without favoring surgical limb. Decrease in swelling in knee.     PTRx Content from today's visit:  Continue HEP per medbridge    A:   Patient is progressing as expected.  Response to therapy has shown an improvement in  gait      P: Patient will continue with the exercise program assigned on their PTRx code and will add the following measures to manage their pain/condition: medbridge     Continue current treatment plan until patient demonstrates readiness to progress to higher level exercises.      Virtual visit contact time    Time of service began: 240 PM  Time of service ended: " 255 PM  Total Time for set up, visit, and documentation: 20 minutes    Payor: [place] / Plan: [employer] [place] / Product Type: Indemnity /   .  Procedure Code/s   Therapeutic Activities (69014): 15 minutes    I have reviewed the note as documented above.  This accurately captures the substance of my conversation with the patient.  Provider location: [place], [place] (City/State)  Patient location: [facility]    ___________________________________________________    Any subjective info about the quality of the visit, ease of use: easy  Patient's opinion about reasonable cost for this visit n/a

## 2020-04-17 ENCOUNTER — VIRTUAL VISIT (OUTPATIENT)
Dept: PHYSICAL THERAPY | Facility: CLINIC | Age: 30
End: 2020-04-17
Payer: COMMERCIAL

## 2020-04-17 DIAGNOSIS — Z47.89 AFTERCARE FOLLOWING SURGERY OF THE MUSCULOSKELETAL SYSTEM: Primary | ICD-10-CM

## 2020-04-17 PROCEDURE — 97112 NEUROMUSCULAR REEDUCATION: CPT | Mod: 95 | Performed by: PHYSICAL THERAPIST

## 2020-04-17 NOTE — PROGRESS NOTES
"Physical Therapy Virtual Follow Up Visit      The patient has been notified of following:     \"This virtual visit will be conducted between you and your provider. We have found that certain health care needs can be provided without the need for physical presence.  This service lets us provide the care you need with a virtual visit.\"    Due to external, as well as internal [facility] management of the COVID-19 Virus, Kamryn Segundo was not seen in our clinic.  As a substitution, we implemented a virtual visit to manage this patient's condition utilizing the [employer] virtual visit platform via the patient s existing code.  The provider, Sherri Sheets, reviewed the patient's chart, PTRx prescription, and spoke with the patient to determine the following telemedicine visit is appropriate and effective for the patient's care.    The following type of visit was completed:   Video Visit:  The PTRx platform uses a synchronous HIPAA compliant video stream for this patient encounter.        S: Kamryn Segundo is a 30 year old female. Connected virtually on the PTRx platform to discuss their condition/progress. They noted improvements in pain, ROM, function.  They noted ongoing pain or limitations with strength.     Current pain level: 0/10  Patient states that she is able to navigate steps reciprocally up and down.  Exercises are going well.    O: Patient demonstrated  deg, full TKE.  Good quadriceps activation.  Swelling around the knee.  Excellent patellar mobility.   PTRx Content from today's visit:  SL squat    A:   Patient is progressing as expected.  Response to therapy has shown an improvement in  function      P: Patient will continue with the exercise program assigned on their PTRx code and will add the following measures to manage their pain/condition: SL squat     Current treatment program is being advanced to more complex exercises.      Virtual visit contact time    Time of service began: 240 PM  Time of " service ended: 255 PM  Total Time for set up, visit, and documentation: 20 minutes    Payor: BCBS / Plan: [employer] [place] / Product Type: Indemnity /   .  Procedure Code/s   Neuromuscular Re-education (68319): 15 minutes    I have reviewed the note as documented above.  This accurately captures the substance of my conversation with the patient.  Provider location: [place], [place] ([facility])  Patient location: home

## 2020-04-21 ENCOUNTER — TELEPHONE (OUTPATIENT)
Dept: ORTHOPEDICS | Facility: CLINIC | Age: 30
End: 2020-04-21

## 2020-04-21 NOTE — TELEPHONE ENCOUNTER
Called and talked to patient.    Explained that due to COVID-19, [facility] is taking steps to try to limit potential patient exposures by reducing face to face visits. A way to do this is by offering video visits as an alternative option for their follow up appointment.    We have reviewed schedules with Dr. Silva and together feel that their follow up appointment, currently scheduled on 4/29 could be done via video.    Patient proceeding with the video appointment.    Telephone Visit  Billing information reviewed: Yes  Time information reviewed: Yes  Contact Number Verified: Yes    Patient informed that they will need to download the [employer] on their smartphone and must be on Wi-Fi. They will receive a invitation link via email at the time of their scheduled appointment.     All of their questions were answered at this time, they will call with any new questions that may arise.    KE Maciel

## 2020-04-29 ENCOUNTER — VIRTUAL VISIT (OUTPATIENT)
Dept: ORTHOPEDICS | Facility: CLINIC | Age: 30
End: 2020-04-29
Payer: COMMERCIAL

## 2020-04-29 DIAGNOSIS — Z98.890 S/P RIGHT KNEE SURGERY: Primary | ICD-10-CM

## 2020-04-29 NOTE — PROGRESS NOTES
"Kamryn Segundo is a 30 year old female who is being evaluated via a billable video visit.      The patient has been notified of following:     \"This video visit will be conducted via a call between you and your physician/provider. We have found that certain health care needs can be provided without the need for an in-person physical exam.  This service lets us provide the care you need with a video conversation.  If a prescription is necessary we can send it directly to your pharmacy.  If lab work is needed we can place an order for that and you can then stop by our lab to have the test done at a later time.    Video visits are billed at different rates depending on your insurance coverage.  Please reach out to your insurance provider with any questions.    If during the course of the call the physician/provider feels a video visit is not appropriate, you will not be charged for this service.\"    Patient has given verbal consent for Video visit? Yes    How would you like to obtain your AVS? Jemma    Patient would like the video invitation sent by: Send to e-mail at: [email or web address]    Will anyone else be joining your video visit? No      DIAGNOSIS:   1. Multi-ligament knee injury  1. ACL tear  2. Fibular collateral ligament tear  3. Biceps femoris avulsion  2. Intact peroneal nerve    PROCEDURES:  1. ACL reconstruction hamstring autograft, posterior lateral corner reconstruction with allograft, peroneal nerve neural lysis date of surgery 1/28/2020    HISTORY:  Doing well 3 months out from the above surgery.  Has made good progress.  Motion is improved extensively.  She states that she only has a little bit of tightness particular in the morning.  She states when her friends saw her walking both on flat ground as well as upstairs they could not tell which leg had been operated upon.    EXAM:     General: Awake, Alert, and oriented. Articulates and communicates with a normal affect     Right lower " Extremity:    Incisions well healed without evidence of infection    No post-operative effusion or ecchymosis    Range of motion examination done via video link today shows full extension as well as flexion to approximately 135 if not even 140 degrees.    Stability exam was not performed.    Neurovascularly intact    IMAGING:  No new imaging    ASSESSMENT:  1. 12 weeks following multi-ligament knee reconstruction    PLAN:   Weightbearing: WBAT  Range of Motion: No range of motion restrictions.   Acitivity Restrictions:  May do straight line running at this time  No running distance or pace restrictions  No cutting, pivoting, or start-stop running  Goal to build strength, endurance, and confidence to allow sports in 3 months time (6 months from the date of surgery)  Brace: Discussed knee bracing options for sports including neoprene knee sleeve ACL functional bracing.   Discussed post-ACL reconstruction therapy program  Follow up: 3 months no XRays with an ACL functional test as completed by physical therapy.         Video-Visit Details    Type of service:  Video Visit    Video Start Time:0940  Video End Time:0950    Originating Location (pt. Location): Home    Distant Location (provider location):  [facility]     Mode of Communication:  Video Conference via [facility]    Rodrigo Silva MD

## 2020-05-14 ENCOUNTER — VIRTUAL VISIT (OUTPATIENT)
Dept: PHYSICAL THERAPY | Facility: CLINIC | Age: 30
End: 2020-05-14
Payer: COMMERCIAL

## 2020-05-14 DIAGNOSIS — Z47.89 AFTERCARE FOLLOWING SURGERY OF THE MUSCULOSKELETAL SYSTEM: Primary | ICD-10-CM

## 2020-05-14 PROCEDURE — 97530 THERAPEUTIC ACTIVITIES: CPT | Mod: 95 | Performed by: PHYSICAL THERAPIST

## 2020-05-14 NOTE — PROGRESS NOTES
"Physical Therapy Virtual Follow Up Visit      The patient has been notified of following:     \"This virtual visit will be conducted between you and your provider. We have found that certain health care needs can be provided without the need for physical presence.  This service lets us provide the care you need with a virtual visit.\"    Due to external, as well as internal [facility] management of the COVID-19 Virus, Kamryn Segundo was not seen in our clinic.  As a substitution, we implemented a virtual visit to manage this patient's condition utilizing the [employer] virtual visit platform via the patient s existing code.  The provider, Sherri Sheets, reviewed the patient's chart, PTRx prescription, and spoke with the patient to determine the following telemedicine visit is appropriate and effective for the patient's care.    The following type of visit was completed:   Video Visit:  The PTRx platform uses a synchronous HIPAA compliant video stream for this patient encounter.        S: Kamryn Segundo is a 30 year old female. Connected virtually on the PTRx platform to discuss their condition/progress. They noted improvements in function, motion.  They noted ongoing pain or limitations with running.     Current pain level: 0/10      O: Patient demonstrated KF ROM to 140.  Full terminal knee extension.  Mild functional valgus with SL squat.     PTRx Content from today's visit:  Return to run program      A:   Patient is progressing as expected.  Response to therapy has shown an improvement in  function      P: Patient will continue with the exercise program assigned on their PTRx code and will add the following measures to manage their pain/condition: run     Current treatment program is being advanced to more complex exercises.      Virtual visit contact time    Time of service began: 1155 AM  Time of service ended: 1205 AM  Total Time for set up, visit, and documentation: 15 minutes    Payor: [employer] / Plan: [employer] [place] " / Product Type: Indemnity /   .  Procedure Code/s   Therapeutic Activities (39491): 10 minutes    I have reviewed the note as documented above.  This accurately captures the substance of my conversation with the patient.  Provider location: [place], [place] (City/State)  Patient location: home

## 2020-07-15 ENCOUNTER — OFFICE VISIT (OUTPATIENT)
Dept: ORTHOPEDICS | Facility: CLINIC | Age: 30
End: 2020-07-15
Payer: COMMERCIAL

## 2020-07-15 DIAGNOSIS — Z98.890 S/P RIGHT KNEE SURGERY: Primary | ICD-10-CM

## 2020-07-15 NOTE — NURSING NOTE
Reason For Visit:   Chief Complaint   Patient presents with     Surgical Followup     DOS 1/28/20 Examination Under Anesthesia Right Knee, Right Knee Arthroscopy, ACL Reconstruction with Hamstring Autograft, Posterior Lateral Corner Reconstruction With Allograft, Biceps Femoris Repair, Peroneal Nerve Neural Lysis       Date of surgery: 1/28/20  Type of surgery:   PROCEDURE:  1. Examination under anesthesia right knee  2. Right knee arthroscopy  3. ACL reconstruction hamstring autograft  4. Fibular collateral ligament reconstruction semi-tendinosis allograft  5. Biceps femoris repair  6. Peroneal nerve neural lysis  7. Medial femoral condyle chondroplasty    Smoker: No  Request smoking cessation information: No    Pain Assessment  Patient Currently in Pain: Yes  Primary Pain Location: Knee    There were no vitals taken for this visit.       No Known Allergies    Current Outpatient Medications   Medication     acetaminophen (TYLENOL) 325 MG tablet     ibuprofen (ADVIL/MOTRIN) 600 MG tablet     ondansetron (ZOFRAN-ODT) 4 MG ODT tab     oxyCODONE (ROXICODONE) 5 MG tablet     senna-docusate (SENOKOT-S/PERICOLACE) 8.6-50 MG tablet     No current facility-administered medications for this visit.          Janell Peraza, ATC

## 2020-07-15 NOTE — PROGRESS NOTES
DIAGNOSIS:   1. Multi-ligament knee injury  1. ACL tear  2. Fibular collateral ligament tear  3. Biceps femoris avulsion    2. Intact peroneal nerve    PROCEDURES:  1. ACL reconstruction hamstring autograft, posterior lateral corner reconstruction with allograft, peroneal nerve neural lysis date of surgery 1/28/2020    HISTORY:  Doing well 6 months out from the above surgery.  Has made good progress.  She is finished with PT and feels like her motion and strength have improved.  She says she is able to run faster. Endorses some subjective tightness when single leg squatting. Wants to play softball and climb. Continues to endorse altered sensation of medial leg. Not taking any pain medications. Has occasional discomfort after sports. Not using a brace or crutches.     EXAM:     General: Awake, Alert, and oriented. Articulates and communicates with a normal affect     Right lower Extremity:    Incisions well healed without evidence of infection    No post-operative effusion or ecchymosis    ROM 0-140    Lachman 1A, stable to anterior and posterior drawer. Stable to varus/valgus stress.     Neurovascularly intact    IMAGING:  No new imaging    ASSESSMENT:  1. 6 months following R multi-ligament knee reconstruction    PLAN:   Weightbearing: WBAT  Range of Motion: No range of motion restrictions.   Activity Restrictions:  May return to sports in controlled fashion (encouraged climbing vs bouldering)   Expect back to sports in approx 2-3 months   Discussed that if she wants a brace for activities, that would be appropriate. She denied at this time.     Follow up: 1 year postop/follow up     Seen and discussed with Dr. Silva.     Annabella Parnell MD

## 2020-07-15 NOTE — LETTER
7/15/2020      RE: Kamryn Segundo  [address]  [facility] [address]         DIAGNOSIS:   1. Multi-ligament knee injury  1. ACL tear  2. Fibular collateral ligament tear  3. Biceps femoris avulsion    2. Intact peroneal nerve    PROCEDURES:  1. ACL reconstruction hamstring autograft, posterior lateral corner reconstruction with allograft, peroneal nerve neural lysis date of surgery 1/28/2020    HISTORY:  Doing well 6 months out from the above surgery.  Has made good progress.  She is finished with PT and feels like her motion and strength have improved.  She says she is able to run faster. Endorses some subjective tightness when single leg squatting. Wants to play softball and climb. Continues to endorse altered sensation of medial leg. Not taking any pain medications. Has occasional discomfort after sports. Not using a brace or crutches.     EXAM:     General: Awake, Alert, and oriented. Articulates and communicates with a normal affect     Right lower Extremity:    Incisions well healed without evidence of infection    No post-operative effusion or ecchymosis    ROM 0-140    Lachman 1A, stable to anterior and posterior drawer. Stable to varus/valgus stress.     Neurovascularly intact    IMAGING:  No new imaging    ASSESSMENT:  1. 6 months following R multi-ligament knee reconstruction    PLAN:   Weightbearing: WBAT  Range of Motion: No range of motion restrictions.   Activity Restrictions:  May return to sports in controlled fashion (encouraged climbing vs bouldering)   Expect back to sports in approx 2-3 months   Discussed that if she wants a brace for activities, that would be appropriate. She denied at this time.     Follow up: 1 year postop/follow up     Seen and discussed with Dr. Silva.     Annabella Parnell MD         Patient seen and examined with the resident.     Assesment: 6 months following Multiligament knee reconstruction, doing well    Plan: Weightbearing as tolerated range of  motion as tolerated  I offered her a brace which at this time she declined  She is okay for straight line running and okay to begin to go back to some side to side activities  Follow-up at the one-year anniversary of surgery or if she ultimately is planning to leave town sooner for a discussion and final checkup    I agree with history, physical and imaging as well as the assessment and plan as detailed by Dr. Parnell.       Rodrigo Silva MD

## 2020-07-15 NOTE — PROGRESS NOTES
Patient seen and examined with the resident.     Assesment: 6 months following Multiligament knee reconstruction, doing well    Plan: Weightbearing as tolerated range of motion as tolerated  I offered her a brace which at this time she declined  She is okay for straight line running and okay to begin to go back to some side to side activities  Follow-up at the one-year anniversary of surgery or if she ultimately is planning to leave town sooner for a discussion and final checkup    I agree with history, physical and imaging as well as the assessment and plan as detailed by Dr. Parnell.

## 2020-09-25 ENCOUNTER — THERAPY VISIT (OUTPATIENT)
Dept: PHYSICAL THERAPY | Facility: CLINIC | Age: 30
End: 2020-09-25
Payer: COMMERCIAL

## 2020-09-25 DIAGNOSIS — Z47.89 AFTERCARE FOLLOWING SURGERY OF THE MUSCULOSKELETAL SYSTEM: Primary | ICD-10-CM

## 2020-09-25 PROCEDURE — 97750 PHYSICAL PERFORMANCE TEST: CPT | Mod: GP | Performed by: PHYSICAL THERAPIST

## 2020-09-25 NOTE — PROGRESS NOTES
"Lower Extremity Physical Performance Testing    Surgery/Injury: s/p multiligament knee reconstructions      Involved Extremity: right   Date of Surgery/Injury: 1/28/20    Surgeon/MD: Shira  Therapist performing test: Sudeep     Primary Treating Therapist: Sudeep    Patient subjective symptom/function report: Patient states that her knee doesn't \"feel normal\" but she is able to run without difficulty.  Feels nervous about rock climbing.    LSI% =Limb Symmetry Index (score comparison between involved/uninvolved extremity)    Anthropomorphic Measures      Range of Motion Jt. Line Circum. Measurement 15 cm Prox Circum. Measurement   Uninvolved 5-0-140 degrees 35 cm 47 cm   Involved 2-0-140 degrees 35 cm 44 cm   Difference  0 cm 3 cm         Single Leg Squat    Uninvolved Extremity 130 degrees   Involved Extremity 110 degrees   LSI% 89%       Prone Plank Hold: Pose: advanced X/60 Seconds   Hold Time 60/60 seconds   LSI% 100%     Side Plank Hold: Pose: advanced X/60 Seconds   Uninvolved Side Down 30/60 seconds   Involved Side Down 35/60 seconds   LSI% 105%     Single Leg Squat Endurance (Reps to 60 KF, 60bpm x 2 minutes) X/60 Reps   Uninvolved Extremity 29/60 reps   Involved Extremity 35/60 reps   LSI% 120%     Single Leg Hop    Uninvolved Extremity 1.46 M   Involved Extremity 1.24 M   LSI% 94%     Return to Sport (Level III): Power   Level III Functional Prerequisites:   1) All Level I tests ?85% of uninvolved side or maximal value, and  2) All Level II tests ?85% of uninvolved side.    6M Timed Hop    Uninvolved Extremity 2.56 seconds   Involved Extremity 2.71 seconds   LSI% 94%     Single Leg Crossover Hop    Uninvolved Extremity 2.90 M   Involved Extremity 3.06 M   LSI% 105%       Notes on QUALITY of body movement patterns:  Mild functional valgus with SL squat.        Assessment/Plan:  Patient is 8 months s/p multiligament knee reconstruction.  Patient would benefit from further strengthening with the next step " program in order to build confidence and strength to maximize her rehab potential.    Exercises Instructed per Test Findings:  1) SL squat, agility  2) Side planks  3) Next Step Program

## 2020-10-12 ENCOUNTER — ALLIED HEALTH/NURSE VISIT (OUTPATIENT)
Dept: ADMINISTRATIVE | Facility: OTHER | Age: 30
End: 2020-10-12

## 2020-12-08 ENCOUNTER — TELEPHONE (OUTPATIENT)
Dept: ORTHOPEDICS | Facility: CLINIC | Age: 30
End: 2020-12-08

## 2020-12-08 NOTE — TELEPHONE ENCOUNTER
[facility] Call Center    Phone Message    May a detailed message be left on voicemail: yes     Reason for Call: Other: Patient is looking for an earlier date for her follow up appt with . She is requesting any openings before January 13 due insurance change around this time. Patient is graduating on January 13th so she wants to be seen before her Insurance changes.      Action Taken: Other: ORTHO    Travel Screening: Not Applicable

## 2020-12-21 ENCOUNTER — OFFICE VISIT (OUTPATIENT)
Dept: ORTHOPEDICS | Facility: CLINIC | Age: 30
End: 2020-12-21
Payer: COMMERCIAL

## 2020-12-21 VITALS — BODY MASS INDEX: 21.51 KG/M2 | WEIGHT: 126 LBS | HEIGHT: 64 IN

## 2020-12-21 DIAGNOSIS — G89.29 CHRONIC PAIN OF RIGHT KNEE: Primary | ICD-10-CM

## 2020-12-21 DIAGNOSIS — M25.561 CHRONIC PAIN OF RIGHT KNEE: Primary | ICD-10-CM

## 2020-12-21 PROCEDURE — 99213 OFFICE O/P EST LOW 20 MIN: CPT | Mod: GC | Performed by: STUDENT IN AN ORGANIZED HEALTH CARE EDUCATION/TRAINING PROGRAM

## 2020-12-21 ASSESSMENT — MIFFLIN-ST. JEOR: SCORE: 1276.53

## 2020-12-21 NOTE — LETTER
12/21/2020         RE: Kamryn Segundo  [address]  [facility] [place] [address]        Dear Colleague,    Thank you for referring your patient, Kamryn Segundo, to the [facility] [place]. Please see a copy of my visit note below.    DIAGNOSIS:   1. Multi-ligament knee injury  1. ACL tear  2. Fibular collateral ligament tear  3. Biceps femoris avulsion     2. Intact peroneal nerve     PROCEDURES:  1. ACL reconstruction hamstring autograft, posterior lateral corner reconstruction with allograft, peroneal nerve neural lysis date of surgery 1/28/2020    HISTORY:  Is now 1 year and has graduated from therapy.  She reports that she has gone back to her normal exercise routine preinjury which includes squats.  She says that this gives her some anterior knee pain as well as posterior thigh pain after her workouts which goes away with some time.  She has not been able to get back to climbing or bouldering secondary to the pandemic.  She also feels like she does fall into varus occasionally but this is not a very common problem for her.  She does not do any cutting or pivoting sports except for the occasional softball.    EXAM:   General: Awake, Alert, and oriented. Articulates and communicates with a normal affect       Knee exam: Range of motion 0 to 130 degrees.  No effusion.  Mild varus opening at 30 degrees with minimal at 0 degrees.  1A Lachman.  Dial test similar to contralateral side for external rotation.      ASSESSMENT:  30 year old female s/p above procedure(s)    PLAN:   1.  She was advised to go back to more muscle specific exercise instead of jumping directly back into her preinjury routine try and slowly work her way into that as her body allows.  2.  Brace was prescribed  3.  Need to return to clinic unless concerns arise.  All questions were answered.      Agustin Villegas MD  Fellow, Sports Medicine & Shoulder  [facility]      Answers for HPI/ROS submitted by  the patient on 12/21/2020   General Symptoms: No  Skin Symptoms: No  HENT Symptoms: No  EYE SYMPTOMS: No  HEART SYMPTOMS: No  LUNG SYMPTOMS: No  INTESTINAL SYMPTOMS: No  URINARY SYMPTOMS: No  GYNECOLOGIC SYMPTOMS: No  BREAST SYMPTOMS: No  SKELETAL SYMPTOMS: No  BLOOD SYMPTOMS: No  NERVOUS SYSTEM SYMPTOMS: No  MENTAL HEALTH SYMPTOMS: No      Patient seen and examined with the resident.     Assesment: 1 year following ACL and PLC    Plan: I had a long discussion with the patient regarding her knee.  Reviewed the diagnosis.  We will get her a playmaker brace to provide some support.  She is activity as tolerated motion as tolerated.  No specific restrictions.  Follow-up with us on an as-needed basis.    I agree with history, physical and imaging as well as the assessment and plan as detailed by Dr. Villegas.         Again, thank you for allowing me to participate in the care of your patient.        Sincerely,        Rodrigo Silva MD

## 2020-12-21 NOTE — PROGRESS NOTES
Patient seen and examined with the resident.     Assesment: 1 year following ACL and PLC    Plan: I had a long discussion with the patient regarding her knee.  Reviewed the diagnosis.  We will get her a playmaker brace to provide some support.  She is activity as tolerated motion as tolerated.  No specific restrictions.  Follow-up with us on an as-needed basis.    I agree with history, physical and imaging as well as the assessment and plan as detailed by Dr. Villegas.

## 2020-12-21 NOTE — NURSING NOTE
"Reason For Visit:   Chief Complaint   Patient presents with     Surgical Followup     DOS 1/28/20 Examination Under Anesthesia Right Knee, Right Knee Arthroscopy, ACL Reconstruction with Hamstring Autograft, Right Posterior Lateral Corner Reconstruction With Allograft, Biceps Femoris Repair, Peroneal Nerve Neural Lysis       Date of surgery: 1/28/20  Type of surgery:    1. Examination under anesthesia right knee  2. Right knee arthroscopy  3. ACL reconstruction hamstring autograft  4. Fibular collateral ligament reconstruction semi-tendinosis allograft  5. Biceps femoris repair  6. Peroneal nerve neural lysis  7. Medial femoral condyle chondroplasty   .       Ht 1.626 m (5' 4\")   Wt 57.2 kg (126 lb)   BMI 21.63 kg/m         No Known Allergies    Current Outpatient Medications   Medication     acetaminophen (TYLENOL) 325 MG tablet     ibuprofen (ADVIL/MOTRIN) 600 MG tablet     ondansetron (ZOFRAN-ODT) 4 MG ODT tab     oxyCODONE (ROXICODONE) 5 MG tablet     senna-docusate (SENOKOT-S/PERICOLACE) 8.6-50 MG tablet     No current facility-administered medications for this visit.          Kristin Aguilera, ATC    "

## 2020-12-21 NOTE — PROGRESS NOTES
DIAGNOSIS:   1. Multi-ligament knee injury  1. ACL tear  2. Fibular collateral ligament tear  3. Biceps femoris avulsion     2. Intact peroneal nerve     PROCEDURES:  1. ACL reconstruction hamstring autograft, posterior lateral corner reconstruction with allograft, peroneal nerve neural lysis date of surgery 1/28/2020    HISTORY:  Is now 1 year and has graduated from therapy.  She reports that she has gone back to her normal exercise routine preinjury which includes squats.  She says that this gives her some anterior knee pain as well as posterior thigh pain after her workouts which goes away with some time.  She has not been able to get back to climbing or bouldering secondary to the pandemic.  She also feels like she does fall into varus occasionally but this is not a very common problem for her.  She does not do any cutting or pivoting sports except for the occasional softball.    EXAM:   General: Awake, Alert, and oriented. Articulates and communicates with a normal affect       Knee exam: Range of motion 0 to 130 degrees.  No effusion.  Mild varus opening at 30 degrees with minimal at 0 degrees.  1A Lachman.  Dial test similar to contralateral side for external rotation.      ASSESSMENT:  30 year old female s/p above procedure(s)    PLAN:   1.  She was advised to go back to more muscle specific exercise instead of jumping directly back into her preinjury routine try and slowly work her way into that as her body allows.  2.  Brace was prescribed  3.  Need to return to clinic unless concerns arise.  All questions were answered.      Agustin Villegas MD  Fellow, Sports Medicine & Shoulder  [facility]      Answers for HPI/ROS submitted by the patient on 12/21/2020   General Symptoms: No  Skin Symptoms: No  HENT Symptoms: No  EYE SYMPTOMS: No  HEART SYMPTOMS: No  LUNG SYMPTOMS: No  INTESTINAL SYMPTOMS: No  URINARY SYMPTOMS: No  GYNECOLOGIC SYMPTOMS: No  BREAST SYMPTOMS: No  SKELETAL SYMPTOMS: No  BLOOD  SYMPTOMS: No  NERVOUS SYSTEM SYMPTOMS: No  MENTAL HEALTH SYMPTOMS: No

## 2020-12-22 ENCOUNTER — ANCILLARY PROCEDURE (OUTPATIENT)
Dept: GENERAL RADIOLOGY | Facility: CLINIC | Age: 30
End: 2020-12-22
Attending: NURSE PRACTITIONER
Payer: COMMERCIAL

## 2020-12-22 ENCOUNTER — OFFICE VISIT (OUTPATIENT)
Dept: FAMILY MEDICINE | Facility: CLINIC | Age: 30
End: 2020-12-22
Payer: COMMERCIAL

## 2020-12-22 ENCOUNTER — TELEPHONE (OUTPATIENT)
Dept: SURGERY | Facility: CLINIC | Age: 30
End: 2020-12-22

## 2020-12-22 VITALS
RESPIRATION RATE: 16 BRPM | SYSTOLIC BLOOD PRESSURE: 96 MMHG | HEART RATE: 61 BPM | OXYGEN SATURATION: 99 % | WEIGHT: 124 LBS | TEMPERATURE: 97.4 F | DIASTOLIC BLOOD PRESSURE: 50 MMHG | BODY MASS INDEX: 21.17 KG/M2 | HEIGHT: 64 IN

## 2020-12-22 DIAGNOSIS — K59.00 CONSTIPATION, UNSPECIFIED CONSTIPATION TYPE: ICD-10-CM

## 2020-12-22 DIAGNOSIS — K92.1 BLOOD IN STOOL: ICD-10-CM

## 2020-12-22 DIAGNOSIS — M53.3 COCCYDYNIA: Primary | ICD-10-CM

## 2020-12-22 PROCEDURE — 99214 OFFICE O/P EST MOD 30 MIN: CPT | Performed by: NURSE PRACTITIONER

## 2020-12-22 PROCEDURE — 72220 X-RAY EXAM SACRUM TAILBONE: CPT | Performed by: RADIOLOGY

## 2020-12-22 ASSESSMENT — MIFFLIN-ST. JEOR: SCORE: 1267.46

## 2020-12-22 NOTE — RESULT ENCOUNTER NOTE
Tayo Graves,    This note is to confirm to you that the radiologist's final interpretation of your coccyx/tailbone came back negative or normal.  Please follow-up with the colorectal specialist as we discussed.  Let me know if you have any questions.    Kailee ESPINOZA CNP

## 2020-12-22 NOTE — PROGRESS NOTES
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  she had a fall 12/2019.  She is still recovering from the fall/right knee injury 12/28/2019.  Surgery 1/2020.  She does not recall a tailbone injury at that time.    Her tailbone started hurting without trauma.  Started a few months ago.  Pain with sitting and pain when she pushes her lower tailbone.      Blood in her stool.  Has been going on \"for quite a while.\"  \"Sometimes I poop blood.\"  The last time it happened was 2-3 days ago.  When she has blood in her stool, she has more coccyx pain.  More problems before her period.  Some constipation.  No abd pain.  She denies feeling dizzy or lightheaded.    Recent gradution from [facility], .    Review of Systems   Constitutional, HEENT, cardiovascular, pulmonary, GI, , musculoskeletal, neuro, skin, endocrine and psych systems are negative, except as otherwise noted.      Objective    BP 96/50 (BP Location: Left arm, Patient Position: Sitting, Cuff Size: Adult Regular)   Pulse 61   Temp 97.4  F (36.3  C) (Temporal)   Resp 16   Ht 1.626 m (5' 4\")   Wt 56.2 kg (124 lb)   LMP 12/05/2020 (Approximate)   SpO2 99%   BMI 21.28 kg/m    Body mass index is 21.28 kg/m .  Physical Exam   GENERAL: healthy, alert and no distress  EYES: Eyes grossly normal to inspection, PERRL and conjunctivae and sclerae normal  NECK: no adenopathy and supple  RESP: lungs clear to auscultation - no rales, rhonchi or wheezes  CV: regular rate and rhythm, normal S1 S2, no S3 or S4, no murmur, click or rub, no peripheral edema and peripheral pulses strong  ABDOMEN: soft, nontender, no hepatosplenomegaly, no masses and bowel sounds normal  RECTAL: normal sphincter tone, no rectal masses  MS: ambulatory with a steady gait  SKIN: warm and " dry  NEURO: Normal strength and tone, mentation intact and speech normal  PSYCH: mentation appears normal, affect normal/bright    Diagnostics:  Reviewed in Epic    Coccyx xray:  No acute changes noted.  This was discussed with the patient I did tell her to expect a formalized radiology report after the radiologist reads her x-rays that were done today.          Assessment & Plan     (M53.3) Coccydynia  (primary encounter diagnosis)  Comment: Acute  Plan: XR Sacrum and Coccyx 2 Views        For today, I encouraged her to continue to avoid aggravating activities, sit uncomfortable questions, etc.  I did tell her I am not certain if her symptoms today are related to blood in her stool but I do want her to follow-up with a colorectal specialist.  See below.    (K92.1) Blood in stool  Comment: Etiology uncertain  Plan: COLORECTAL SURGERY REFERRAL        Today, I did tell her to increase the fiber in her diet, prevent constipation, drink extra fluids etc.  She is to follow-up with colorectal ASAP for a consultation and additional treatment options.  I did tell her that blood in her stool is not normal and she does need to follow-up.    Labs today were considered for hemoglobin and a blood count, but the patient declined.  She agrees and understands.    (K59.00) Constipation, unspecified constipation type  Comment: Chronic  Plan: COLORECTAL SURGERY REFERRAL        High-fiber diet was encouraged.  I did also encourage her to drink 8+ glasses of water a day, continue aerobic activity etc.  I did tell her to discuss this further with her colorectal specialty appointment.          See Patient Instructions    Return in about 4 weeks (around 1/19/2021).    OLGA Jesus [facility]

## 2020-12-22 NOTE — TELEPHONE ENCOUNTER
"Patient intake call for \"blood in stool\".    Called to assess symptoms.    History:    Patient seen today in  Family Medicine.    Referred to colorectal surgery for evaluation.  Current Symptoms:    Bright red blood in stool and dripping into toilet.    Patient reports no light headiness or dizziness.    Tailbone pain as well, not sure if related.  Plan:    Patient schedule 1/15/21 with MELIA Jenkins, I believe this is appropriate.    Insurance changes on 1/13, if possible, will place on wait list.    Informed patient to call if increase in blood in stool or urgent symptoms.    Ant Morales, EMT  Colon and Rectal Surgery    "

## 2020-12-22 NOTE — TELEPHONE ENCOUNTER
[facility] Call Center    Phone Message    May a detailed message be left on voicemail: yes     Reason for Call: Other: New patient with DX of blood in susan Leyva for 1/15/2021    Please call patient to assess  Action Taken: Message routed to:  [facility] ([facility] Colon and Recs    Travel Screening: Not Applicable

## 2020-12-22 NOTE — PATIENT INSTRUCTIONS
Patient Education     Understanding Coccydynia    Coccydynia is pain at the lowest tip of the spine (the coccyx, or tailbone). This is sometimes called a  bruised tailbone.  Tailbone pain can be very uncomfortable. It can also interfere with daily activities, such as driving.    How to say it  sukumar thakur  What causes coccydynia?   Causes of tailbone pain include:    Injury to the tailbone from a blow or fall    A bone spur on the tailbone    Poor posture while sitting    Sitting for a long time in an uncomfortable position    Vaginal childbirth    Arthritis  In some cases, the cause of the pain can t be found.   Symptoms of coccydynia   You may feel:     A dull ache or sharp pain in the tailbone area, near the top of the buttocks    Muscle spasms in the lower back or pelvic area    A sense of pressure in the rectum  Pain may be triggered by things like walking or standing up from sitting. It may hurt more if you sit for long periods. Things that put pressure on the tailbone, such as riding a horse or having sex, may also trigger the pain.   Treatment for coccydynia   Tailbone pain often goes away by itself within a few months. Treatment focuses on reducing pain and protecting the tailbone. Treatments can include:     Over-the-counter or prescription medicine to help relieve pain and swelling. NSAIDs (nonsteroidal anti-inflammatory drugs) are the most common medicines used. Medicines may be prescribed or bought over the counter. They may be given as pills. Or they may be put on the skin as a gel, cream, or patch.    Warm or cold to help relieve symptoms for a time, such as a heating pad, hot water bottle, or ice pack    Keeping pressure off the tailbone to help the area heal by shifting weight forward onto your hipbones and thighs when sitting or sitting on a special cushion    Medicine injected into the area to help relieve severe symptoms. The medicine is usually a corticosteroid. This is a strong  anti-inflammatory medicine.    Physical therapy to help strengthen the structures around the tailbone  If no other treatments work, you may need surgery to remove all or part of the coccyx.   When to call your healthcare provider   Call your healthcare provider right away if you have any of these:     Pain that continues for more than 2 months or gets worse    Pain that limits your usual activities    Pain that doesn t get better by trying the self-care treatments described above    Fever of 100.4 F (38 C) or higher, or as directed by your provider    Chills    Redness or swelling    A new sore in the cleft of the buttocks, especially one that contains or drains pus    Other new symptoms  [employer] last reviewed this educational content on 6/1/2019 2000-2020 [employer]. [address] [place] [address], [place] [id number]. All rights reserved. This information is not intended as a substitute for professional medical care. Always follow your healthcare professional's instructions.         Patient Education     When You Have Gastrointestinal (GI) Bleeding    Blood in your vomit or stool can be a sign of gastrointestinal (GI) bleeding. GI bleeding can be scary. But the cause may not be serious. You should always see a doctor if you have GI bleeding.   The GI tract is the path through which food travels in the body. Food passes from the mouth down the esophagus. This is the tube from the mouth to the stomach. Food starts to break down in the stomach. It then moves through the duodenum , the first part of the small intestine . Nutrients are absorbed as food travels through the small intestine. What is left passes into the colon (large intestine) as waste. The colon removes water from the waste. Waste continues from the colon to the rectum (where stool is stored). Waste then leaves the body through the anus . The upper GI tract is from the mouth through the duodenum. The lower GI tract is from the end of the  duodenum to the anus.   Causes of GI bleeding  GI bleeding can be caused by many different problems. Some of the more common causes include:     Swollen veins in the anus (hemorrhoids)    Swollen veins in the esophagus (varices)    Sore on the lining of the GI tract (ulcer)    Cuts or scrapes in the mouth or throat    Infection caused by germs such as bacteria or parasites    Food allergies, such as milk allergy in young children    Medicines, especially aspirin, blood thinners, and NSAIDs (non-steroidal anti-inflammatory drugs) such as ibuprofen    Inflammation of the GI tract (gastritis or esophagitis)    Colitis (Crohn's disease or ulcerative colitis)    Cancer (tumors or polyps)    Abnormal pouches in the colon (diverticula)    Tears in the esophagus or anus    Nosebleed    Abnormal blood vessels in the GI tract (angiodysplasia)  Diagnosing the cause of blood in stool   If blood is coming out in your stool, you may have a lower GI tract problem or a very fast upper GI tract bleed. Bleeding from the GI tract can be bright red. Or it may look dark and tarry. Tests may also find blood in your stool that can t be seen with the eye (occult blood). To find out the cause, tests that may be ordered include:     Blood tests. A blood sample is taken and sent to a lab for exam.    Hemoccult test. Checks a stool sample for blood.    Stool culture. Checks a stool sample for bacteria or parasites.    X-ray, ultrasound, nuclear scan, or CT scan. Imaging tests that take pictures of the digestive tract.    Colonoscopy or sigmoidoscopy. This test uses a flexible tube with a tiny camera. The tube is inserted through your anus into your rectum to see the inside of your colon. Your provider can also take a tiny tissue sample (biopsy) and treat a bleeding source    Capsule endoscopy. This test uses a tiny camera that is swallowed, passes through the intestine, and takes pictures of the small intestine that is more difficult to reach  with scopes.  Diagnosing the cause of blood in vomit   If you are vomiting blood or something that looks like coffee grounds, you may have an upper GI tract problem. To find the cause, tests that may be done include:     Upper endoscopy. A flexible tube with a tiny camera is inserted through your mouth and throat to see inside your upper GI tract. This lets your provider take a tiny tissue sample (biopsy) and treat a bleeding source.    Nasogastric lavage. The healthcare provider may withdraw some of the fluid in the stomach to test it for bleeding. This can sometimes tell if you have upper GI or lower GI bleeding.    X-ray, ultrasound, nuclear scan, or CT scan. Imaging tests that take pictures of your digestive tract.    Upper GI series. X-rays of the upper part of your GI tract taken after swallowing a contrast drink. .    Enteroscopy. This sends a flexible tube or a small, swallowed capsule camera into your small intestine.  When to call your healthcare provider  Call your healthcare provider right away if you have any of the following:     Fever of 100.4  F ( 38.0 ) or higher    Signs of fluid loss (dehydration). These include a dry, sticky mouth, decreased urine output, and very dark urine.    Belly (abdominal) pain  Call 911  Call 911, or get medical care right away  if any of the following occur:     Bleeding from your mouth or anus that can't be stopped    Bleeding along with feeling lightheaded or dizzy  Aaron last reviewed this educational content on 6/1/2019 2000-2020 [employer]. [address] [place] [address]. All rights reserved. This information is not intended as a substitute for professional medical care. Always follow your healthcare professional's instructions.         Patient Education     Constipation (Adult)  Constipation means that you have bowel movements that are less frequent than usual. Stools often become very hard and difficult to pass.  Constipation is  very common. At some point in life, it affects almost everyone. Since everyone's bowel habits are different, what is constipation to one person may not be to another. Your healthcare provider may do tests to diagnose constipation. It depends on what he or she finds when evaluating you.    Symptoms of constipation include:    Abdominal pain    Bloating    Vomiting    Painful bowel movements    Itching, swelling, bleeding, or pain around the anus  Causes  Constipation can have many causes. These include:    Diet low in fiber    Too much dairy    Not drinking enough liquids    Lack of exercise or physical activity (especially true for older adults)    Changes in lifestyle or daily routine, including pregnancy, aging, work, and travel    Frequent use or misuse of laxatives    Ignoring the urge to have a bowel movement or delaying it until later    Medicines, such as certain prescription pain medicines, iron supplements, antacids, certain antidepressants, and calcium supplements    Diseases like irritable bowel syndrome, bowel obstructions, stroke, diabetes, thyroid disease, Parkinson disease, hemorrhoids, and colon cancer  Complications  Potential complications of constipation can include:    Hemorrhoids    Rectal bleeding from hemorrhoids or anal fissures (skin tears)    Hernias    Dependency on laxatives    Chronic constipation    Fecal impaction, a severe form of constipation in which a large amount of hard stool is in your rectum that you can't pass    Bowel obstruction or perforation  Home care  All treatment should be done after talking with your healthcare provider. This is especially true if you have another medical problems, are taking prescription medicines, or are an older adult. Treatment most often involves lifestyle changes. You may also need medicines. Your healthcare provider will tell you which will work best for you. Follow the advice below to help avoid this problem in the future.  Lifestyle  changes  These lifestyle changes can help prevent constipation:    Diet. Eat a high-fiber diet, with fresh fruit and vegetables, and reduce dairy intake, meats, and processed foods    Fluids. It's important to get enough fluids each day. Drink plenty of water when you eat more fiber. If you are on diet that limits the amount of fluid you can have, talk about this with your healthcare provider.    Regular exercise. Check with your healthcare provider first.  Medicines  Take any medicines as directed. Some laxatives are safe to use only every now and then. Others can be taken on a regular basis. While laxatives don't cause bowel dependence, they are treating the symptoms. So your constipation may return if you don't make other changes. Talk with your healthcare provider or pharmacist if you have questions.  Prescription pain medicines can cause constipation. If you are taking this kind of medicine, ask your healthcare provider if you should also take a stool softener.  Medicines you may take to treat constipation include:    Fiber supplements    Stool softeners    Laxatives    Enemas    Rectal suppositories  Follow-up care  Follow up with your healthcare provider if symptoms don't get better in the next few days. You may need to have more tests or see a specialist.  Call 911  Call 911 if any of these occur:    Trouble breathing    Stiff, rigid abdomen that is severely painful to touch    Confusion    Fainting or loss of consciousness    Rapid heart rate    Chest pain  When to seek medical advice  Call your healthcare provider right away if any of these occur:    Fever of 100.4 F (38 C) or higher, or as directed by your healthcare provider    Failure to resume normal bowel movements    Pain in your abdomen or back gets worse    Nausea or vomiting    Swelling in your abdomen    Blood in the stool    Black, tarry stool    Involuntary weight loss    Weakness  Aaron last reviewed this educational content on 6/1/2018     [id number] [employer]. [address] [place] [address], [place] [address]. All rights reserved. This information is not intended as a substitute for professional medical care. Always follow your healthcare professional's instructions.

## 2020-12-23 NOTE — TELEPHONE ENCOUNTER
RECORDS RECEIVED FROM: Internal    DATE RECEIVED: 1/15/2021    NOTES STATUS DETAILS   OFFICE NOTE from referring provider  Internal Internal referral 12/22/20

## 2020-12-25 ENCOUNTER — MYC MEDICAL ADVICE (OUTPATIENT)
Dept: FAMILY MEDICINE | Facility: CLINIC | Age: 30
End: 2020-12-25

## 2020-12-28 NOTE — TELEPHONE ENCOUNTER
Triage--I am out of the clinic until January 5th.  If grade needs help getting an appointment with gi, please help her or please forward any additional concerns to the [facility] Provider group.    Thank you.

## 2021-01-15 ENCOUNTER — PRE VISIT (OUTPATIENT)
Dept: SURGERY | Facility: CLINIC | Age: 31
End: 2021-01-15

## 2021-04-25 ENCOUNTER — HEALTH MAINTENANCE LETTER (OUTPATIENT)
Age: 31
End: 2021-04-25

## 2021-08-16 ENCOUNTER — OFFICE VISIT (OUTPATIENT)
Dept: ORTHOPEDICS | Facility: CLINIC | Age: 31
End: 2021-08-16
Payer: COMMERCIAL

## 2021-08-16 VITALS — HEIGHT: 64 IN | BODY MASS INDEX: 21.17 KG/M2 | WEIGHT: 124 LBS

## 2021-08-16 DIAGNOSIS — G89.29 CHRONIC PAIN OF RIGHT KNEE: Primary | ICD-10-CM

## 2021-08-16 DIAGNOSIS — M25.561 CHRONIC PAIN OF RIGHT KNEE: Primary | ICD-10-CM

## 2021-08-16 PROCEDURE — 99213 OFFICE O/P EST LOW 20 MIN: CPT | Mod: GC | Performed by: ORTHOPAEDIC SURGERY

## 2021-08-16 ASSESSMENT — MIFFLIN-ST. JEOR: SCORE: 1262.46

## 2021-08-16 NOTE — LETTER
8/16/2021         RE: Kamryn Segundo  [address]  [facility] [address]        Dear Colleague,    Thank you for referring your patient, Kamryn Segundo, to the [facility] [place]. Please see a copy of my visit note below.    DIAGNOSIS:   1. Multi-ligament knee injury  1. ACL tear  2. Fibular collateral ligament tear  3. Biceps femoris avulsion     2. Intact peroneal nerve     PROCEDURES:  1. ACL reconstruction hamstring autograft, posterior lateral corner reconstruction with allograft, peroneal nerve neural lysis date of surgery 1/28/2020    HISTORY:  now 18 months post surgery. Knee was back to baseline. She played softball 2 weeks ago without incident but noticed knee pain after removing her brace. She feels the pain has improved but unsure if she is back to her baseline state prior to the game. Knee feels stable. No brace use thereafter. No PT. Otherwise knee ok.     EXAM:   General: Awake, Alert, and oriented. Articulates and communicates with a normal affect       Knee exam: Range of motion 0 to 130 degrees.  No effusion.  Mild varus opening at 30 degrees with minimal at 0 degrees.  1A Lachman.  Dial test similar to contralateral side for external rotation.     ASSESSMENT:  30 year old female 18 months s/p above procedure(s)    PLAN:   1.  Activities as tolerated   2.  Brace as prescribed  3.  No need to return to clinic unless concerns arise.  All questions were answered.    Alejandra Abraham MD   Orthopedic Surgery, PGY5    Answers for HPI/ROS submitted by the patient on 12/21/2020   General Symptoms: No  Skin Symptoms: No  HENT Symptoms: No  EYE SYMPTOMS: No  HEART SYMPTOMS: No  LUNG SYMPTOMS: No  INTESTINAL SYMPTOMS: No  URINARY SYMPTOMS: No  GYNECOLOGIC SYMPTOMS: No  BREAST SYMPTOMS: No  SKELETAL SYMPTOMS: No  BLOOD SYMPTOMS: No  NERVOUS SYSTEM SYMPTOMS: No  MENTAL HEALTH SYMPTOMS: No    Answers for HPI/ROS submitted by the patient on 8/16/2021  General  Symptoms: No  Skin Symptoms: No  HENT Symptoms: No  EYE SYMPTOMS: No  HEART SYMPTOMS: No  LUNG SYMPTOMS: No  INTESTINAL SYMPTOMS: No  URINARY SYMPTOMS: No  GYNECOLOGIC SYMPTOMS: No  BREAST SYMPTOMS: No  SKELETAL SYMPTOMS: No  BLOOD SYMPTOMS: No  NERVOUS SYSTEM SYMPTOMS: No  MENTAL HEALTH SYMPTOMS: No        Patient seen and examined with the resident.     Assesment: 18 months following multiligament knee reconstruction increased pain in the following episode while playing softball.  Examination confirms knee is stable to both ACL testing as well as lateral sided testing.  Mild ankle pain with climbing.    Plan: Overall she is done tremendously well following her multiligament injury.  She is return to all sports without any limitations.  She states that her knee feels stable she is overall very happy with how she is done.  She did recently have an episode where her knee was painful during softball.  I do not think this represents anything intrinsically dangerous.  She is here to recommit her self to home therapy program.  She will follow-up as necessary.    I agree with history, physical and imaging as well as the assessment and plan as detailed by Dr. Abraham.         Again, thank you for allowing me to participate in the care of your patient.        Sincerely,        Rodrigo Silva MD

## 2021-08-16 NOTE — NURSING NOTE
"Reason For Visit:   Chief Complaint   Patient presents with     Consult     Right knee         ?  No  Occupation  .  Currently working? Yes.  Work status?  Full time.  Date of injury: 2 weeks ago  Type of injury: Softball.  Date of surgery: 1/28/21  Type of surgery:   PROCEDURE:  1. Examination under anesthesia right knee  2. Right knee arthroscopy  3. ACL reconstruction hamstring autograft  4. Fibular collateral ligament reconstruction semi-tendinosis allograft  5. Biceps femoris repair  6. Peroneal nerve neural lysis  7. Medial femoral condyle chondroplasty     She was playing softball in her brace 2 weeks ago, she had no pain at the time but after removing her brace she started to have anterior and medial knee pain. She has also noticed pain into her quad and calf as well.     Pain Assessment  Patient Currently in Pain: Yes  0-10 Pain Scale: 6  Primary Pain Location: Knee    Ht 1.626 m (5' 4\")   Wt 56.2 kg (124 lb)   BMI 21.28 kg/m         No Known Allergies    Current Outpatient Medications   Medication     acetaminophen (TYLENOL) 325 MG tablet     ibuprofen (ADVIL/MOTRIN) 600 MG tablet     ondansetron (ZOFRAN-ODT) 4 MG ODT tab     oxyCODONE (ROXICODONE) 5 MG tablet     senna-docusate (SENOKOT-S/PERICOLACE) 8.6-50 MG tablet     No current facility-administered medications for this visit.         Taniya Lopez, ATC    "

## 2021-08-16 NOTE — PROGRESS NOTES
Patient seen and examined with the resident.     Assesment: 18 months following multiligament knee reconstruction increased pain in the following episode while playing softball.  Examination confirms knee is stable to both ACL testing as well as lateral sided testing.  Mild ankle pain with climbing.    Plan: Overall she is done tremendously well following her multiligament injury.  She is return to all sports without any limitations.  She states that her knee feels stable she is overall very happy with how she is done.  She did recently have an episode where her knee was painful during softball.  I do not think this represents anything intrinsically dangerous.  She is here to recommit her self to home therapy program.  She will follow-up as necessary.    I agree with history, physical and imaging as well as the assessment and plan as detailed by Dr. Abraham.

## 2021-08-16 NOTE — PROGRESS NOTES
DIAGNOSIS:   1. Multi-ligament knee injury  1. ACL tear  2. Fibular collateral ligament tear  3. Biceps femoris avulsion     2. Intact peroneal nerve     PROCEDURES:  1. ACL reconstruction hamstring autograft, posterior lateral corner reconstruction with allograft, peroneal nerve neural lysis date of surgery 1/28/2020    HISTORY:  now 18 months post surgery. Knee was back to baseline. She played softball 2 weeks ago without incident but noticed knee pain after removing her brace. She feels the pain has improved but unsure if she is back to her baseline state prior to the game. Knee feels stable. No brace use thereafter. No PT. Otherwise knee ok.     EXAM:   General: Awake, Alert, and oriented. Articulates and communicates with a normal affect       Knee exam: Range of motion 0 to 130 degrees.  No effusion.  Mild varus opening at 30 degrees with minimal at 0 degrees.  1A Lachman.  Dial test similar to contralateral side for external rotation.     ASSESSMENT:  30 year old female 18 months s/p above procedure(s)    PLAN:   1.  Activities as tolerated   2.  Brace as prescribed  3.  No need to return to clinic unless concerns arise.  All questions were answered.    Alejandra Abraham MD   Orthopedic Surgery, PGY5    Answers for HPI/ROS submitted by the patient on 12/21/2020   General Symptoms: No  Skin Symptoms: No  HENT Symptoms: No  EYE SYMPTOMS: No  HEART SYMPTOMS: No  LUNG SYMPTOMS: No  INTESTINAL SYMPTOMS: No  URINARY SYMPTOMS: No  GYNECOLOGIC SYMPTOMS: No  BREAST SYMPTOMS: No  SKELETAL SYMPTOMS: No  BLOOD SYMPTOMS: No  NERVOUS SYSTEM SYMPTOMS: No  MENTAL HEALTH SYMPTOMS: No    Answers for HPI/ROS submitted by the patient on 8/16/2021  General Symptoms: No  Skin Symptoms: No  HENT Symptoms: No  EYE SYMPTOMS: No  HEART SYMPTOMS: No  LUNG SYMPTOMS: No  INTESTINAL SYMPTOMS: No  URINARY SYMPTOMS: No  GYNECOLOGIC SYMPTOMS: No  BREAST SYMPTOMS: No  SKELETAL SYMPTOMS: No  BLOOD SYMPTOMS: No  NERVOUS SYSTEM SYMPTOMS:  No  MENTAL HEALTH SYMPTOMS: No

## 2021-10-09 ENCOUNTER — HEALTH MAINTENANCE LETTER (OUTPATIENT)
Age: 31
End: 2021-10-09

## 2022-05-21 ENCOUNTER — HEALTH MAINTENANCE LETTER (OUTPATIENT)
Age: 32
End: 2022-05-21

## 2022-09-17 ENCOUNTER — HEALTH MAINTENANCE LETTER (OUTPATIENT)
Age: 32
End: 2022-09-17

## 2022-10-07 DIAGNOSIS — G89.29 CHRONIC PAIN OF RIGHT KNEE: Primary | ICD-10-CM

## 2022-10-07 DIAGNOSIS — M25.561 CHRONIC PAIN OF RIGHT KNEE: Primary | ICD-10-CM

## 2022-10-10 ENCOUNTER — OFFICE VISIT (OUTPATIENT)
Dept: ORTHOPEDICS | Facility: CLINIC | Age: 32
End: 2022-10-10
Payer: COMMERCIAL

## 2022-10-10 ENCOUNTER — ANCILLARY PROCEDURE (OUTPATIENT)
Dept: GENERAL RADIOLOGY | Facility: CLINIC | Age: 32
End: 2022-10-10
Attending: ORTHOPAEDIC SURGERY
Payer: COMMERCIAL

## 2022-10-10 DIAGNOSIS — M25.561 CHRONIC PAIN OF RIGHT KNEE: ICD-10-CM

## 2022-10-10 DIAGNOSIS — M25.561 CHRONIC PAIN OF RIGHT KNEE: Primary | ICD-10-CM

## 2022-10-10 DIAGNOSIS — G89.29 CHRONIC PAIN OF RIGHT KNEE: ICD-10-CM

## 2022-10-10 DIAGNOSIS — G89.29 CHRONIC PAIN OF RIGHT KNEE: Primary | ICD-10-CM

## 2022-10-10 PROCEDURE — 73562 X-RAY EXAM OF KNEE 3: CPT | Mod: RT | Performed by: RADIOLOGY

## 2022-10-10 PROCEDURE — 99213 OFFICE O/P EST LOW 20 MIN: CPT | Performed by: ORTHOPAEDIC SURGERY

## 2022-10-10 RX ORDER — CALCIPOTRIENE 50 UG/G
0.01 CREAM TOPICAL DAILY
COMMUNITY
Start: 2022-10-06

## 2022-10-10 NOTE — NURSING NOTE
Reason For Visit:   Chief Complaint   Patient presents with     Right Knee - RECHECK     ?  No  Occupation: Office Work.   Currently working? Yes.  Work status?  Full time.    Date of injury: None  Type of injury: None.    Date of surgery: 1/28/2020  Type of surgery:   PROCEDURE:  1. Examination under anesthesia right knee  2. Right knee arthroscopy  3. ACL reconstruction hamstring autograft  4. Fibular collateral ligament reconstruction semi-tendinosis allograft  5. Biceps femoris repair  6. Peroneal nerve neural lysis  7. Medial femoral condyle chondroplasty    Patient reports when she is walking, she will have a snapping in her right knee randomly with walking within the last month.  The snapping is not painful but she is concerned the alignment of her knee is off. She does describe report after activity she will have stiffness over her distal quad and proximal calf. This has been a continuation since her surgery in 2020.    Patient has recently been seen for her right ankle and right planter foot pain.     Smoker: No  Request smoking cessation information: No    SANE Score  Left Knee: 90%  Right Knee: 75%    Pain Assessment  Patient Currently in Pain: Yes  0-10 Pain Scale: 4  Primary Pain Location: Knee    There were no vitals taken for this visit.       No Known Allergies    Current Outpatient Medications   Medication     calcipotriene (DOVONOX) 0.005 % external cream     acetaminophen (TYLENOL) 325 MG tablet     ibuprofen (ADVIL/MOTRIN) 600 MG tablet     ondansetron (ZOFRAN-ODT) 4 MG ODT tab     oxyCODONE (ROXICODONE) 5 MG tablet     senna-docusate (SENOKOT-S/PERICOLACE) 8.6-50 MG tablet     No current facility-administered medications for this visit.         La Evans, ATC

## 2022-10-10 NOTE — LETTER
10/10/2022         RE: Kamryn Segundo  [address]  [facility] [address]        Dear Colleague,    Thank you for referring your patient, Kamryn Segundo, to the [facility] [place]. Please see a copy of my visit note below.    DIAGNOSIS:   1. Multi-ligament right knee injury  1. ACL tear  2. Fibular collateral ligament tear  3. Biceps femoris avulsion    PROCEDURES:  1. ACL reconstruction hamstring autograft, posterior lateral corner reconstruction with allograft, peroneal nerve neural lysis date of surgery 1/28/2020    HISTORY:  Ely returns to my clinic today for interval follow-up she is a very pleasant 32-year-old female.  Well-known to me.  She is status post multi ligament knee reconstruction in January 2020.  She is done very well.  She came in today with a catching sensation about her right knee its not really painful to her.  She has returned to [facility].  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  Overall she is really quite happy with how she is doing though she did want to get checked out as she had the sensation    EXAM:   General: Awake, Alert, and oriented. Articulates and communicates with a normal affect      Range of motion is full.  0 to 140 degrees    Stable to varus and valgus stress testing no opening at either 0 or 30 degrees to varus stress testing    Lachman 0, no pivot shift    1 quadrant medial lateral translation of patella.  Tilt to neutral.    IMAGING: No fractures, dislocations, well preserved joint space, intraosseus femoral cortical button, no change in position    ASSESSMENT:  Nearly 3 years status post multi ligament knee reconstruction    PLAN:   1.  Weightbearing as tolerated  2.  Range of motion as tolerated  3.  No specific restrictions besides commonsense   4.  I am going to give her a new prescription today for physical therapy  5.  F/u as needed, if symptoms persist, will  get new mri of knee to eval for potential cause - meniscus vs cartilage      Again, thank you for allowing me to participate in the care of your patient.        Sincerely,        Rodrigo Silva MD

## 2022-10-14 NOTE — PROGRESS NOTES
DIAGNOSIS:   1. Multi-ligament right knee injury  1. ACL tear  2. Fibular collateral ligament tear  3. Biceps femoris avulsion    PROCEDURES:  1. ACL reconstruction hamstring autograft, posterior lateral corner reconstruction with allograft, peroneal nerve neural lysis date of surgery 1/28/2020    HISTORY:  Ely returns to my clinic today for interval follow-up she is a very pleasant 32-year-old female.  Well-known to me.  She is status post multi ligament knee reconstruction in January 2020.  She is done very well.  She came in today with a catching sensation about her right knee its not really painful to her.  She has returned to [facility].  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  Overall she is really quite happy with how she is doing though she did want to get checked out as she had the sensation    EXAM:   General: Awake, Alert, and oriented. Articulates and communicates with a normal affect      Range of motion is full.  0 to 140 degrees    Stable to varus and valgus stress testing no opening at either 0 or 30 degrees to varus stress testing    Lachman 0, no pivot shift    1 quadrant medial lateral translation of patella.  Tilt to neutral.    IMAGING: No fractures, dislocations, well preserved joint space, intraosseus femoral cortical button, no change in position    ASSESSMENT:  Nearly 3 years status post multi ligament knee reconstruction    PLAN:   1.  Weightbearing as tolerated  2.  Range of motion as tolerated  3.  No specific restrictions besides commonsense   4.  I am going to give her a new prescription today for physical therapy  5.  F/u as needed, if symptoms persist, will get new mri of knee to eval for potential cause - meniscus vs cartilage

## 2023-01-23 ENCOUNTER — HEALTH MAINTENANCE LETTER (OUTPATIENT)
Age: 33
End: 2023-01-23

## 2023-10-20 ENCOUNTER — LAB REQUISITION (OUTPATIENT)
Dept: LAB | Facility: CLINIC | Age: 33
End: 2023-10-20
Payer: COMMERCIAL

## 2023-10-20 DIAGNOSIS — Z36.9 ENCOUNTER FOR ANTENATAL SCREENING, UNSPECIFIED: ICD-10-CM

## 2023-10-20 LAB
BASO+EOS+MONOS # BLD AUTO: ABNORMAL 10*3/UL
BASO+EOS+MONOS NFR BLD AUTO: ABNORMAL %
BASOPHILS # BLD AUTO: 0 10E3/UL (ref 0–0.2)
BASOPHILS NFR BLD AUTO: 0 %
EOSINOPHIL # BLD AUTO: 0.1 10E3/UL (ref 0–0.7)
EOSINOPHIL NFR BLD AUTO: 1 %
ERYTHROCYTE [DISTWIDTH] IN BLOOD BY AUTOMATED COUNT: 12.7 % (ref 10–15)
HCT VFR BLD AUTO: 36.5 % (ref 35–47)
HGB BLD-MCNC: 12 G/DL (ref 11.7–15.7)
HOLD SPECIMEN: NORMAL
IMM GRANULOCYTES # BLD: 0 10E3/UL
IMM GRANULOCYTES NFR BLD: 0 %
LYMPHOCYTES # BLD AUTO: 1.3 10E3/UL (ref 0.8–5.3)
LYMPHOCYTES NFR BLD AUTO: 17 %
MCH RBC QN AUTO: 29.3 PG (ref 26.5–33)
MCHC RBC AUTO-ENTMCNC: 32.9 G/DL (ref 31.5–36.5)
MCV RBC AUTO: 89 FL (ref 78–100)
MONOCYTES # BLD AUTO: 0.5 10E3/UL (ref 0–1.3)
MONOCYTES NFR BLD AUTO: 7 %
NEUTROPHILS # BLD AUTO: 5.5 10E3/UL (ref 1.6–8.3)
NEUTROPHILS NFR BLD AUTO: 75 %
NRBC # BLD AUTO: 0 10E3/UL
NRBC BLD AUTO-RTO: 0 /100
PLATELET # BLD AUTO: 106 10E3/UL (ref 150–450)
RBC # BLD AUTO: 4.09 10E6/UL (ref 3.8–5.2)
WBC # BLD AUTO: 7.3 10E3/UL (ref 4–11)

## 2023-10-20 PROCEDURE — 86803 HEPATITIS C AB TEST: CPT | Mod: ORL | Performed by: INTERNAL MEDICINE

## 2023-10-20 PROCEDURE — 86762 RUBELLA ANTIBODY: CPT | Mod: ORL | Performed by: INTERNAL MEDICINE

## 2023-10-20 PROCEDURE — 87086 URINE CULTURE/COLONY COUNT: CPT | Mod: ORL | Performed by: INTERNAL MEDICINE

## 2023-10-20 PROCEDURE — 86592 SYPHILIS TEST NON-TREP QUAL: CPT | Mod: ORL | Performed by: INTERNAL MEDICINE

## 2023-10-20 PROCEDURE — 85025 COMPLETE CBC W/AUTO DIFF WBC: CPT | Mod: ORL | Performed by: INTERNAL MEDICINE

## 2023-10-20 PROCEDURE — 86850 RBC ANTIBODY SCREEN: CPT | Mod: ORL | Performed by: INTERNAL MEDICINE

## 2023-10-20 PROCEDURE — 86901 BLOOD TYPING SEROLOGIC RH(D): CPT | Mod: ORL | Performed by: INTERNAL MEDICINE

## 2023-10-20 PROCEDURE — 87340 HEPATITIS B SURFACE AG IA: CPT | Mod: ORL | Performed by: INTERNAL MEDICINE

## 2023-10-20 PROCEDURE — 87389 HIV-1 AG W/HIV-1&-2 AB AG IA: CPT | Mod: ORL | Performed by: INTERNAL MEDICINE

## 2023-10-21 LAB
ABO/RH(D): NORMAL
ANTIBODY SCREEN: NEGATIVE
HIV 1+2 AB+HIV1 P24 AG SERPL QL IA: NONREACTIVE
SPECIMEN EXPIRATION DATE: NORMAL

## 2023-10-22 LAB — BACTERIA UR CULT: NORMAL

## 2023-10-23 LAB
HBV SURFACE AG SERPL QL IA: NONREACTIVE
HCV AB SERPL QL IA: NONREACTIVE
RPR SER QL: NONREACTIVE
RUBV IGG SERPL QL IA: 5.1 INDEX
RUBV IGG SERPL QL IA: POSITIVE

## 2023-11-22 ENCOUNTER — LAB REQUISITION (OUTPATIENT)
Dept: LAB | Facility: CLINIC | Age: 33
End: 2023-11-22
Payer: COMMERCIAL

## 2023-11-22 DIAGNOSIS — D69.6 THROMBOCYTOPENIA, UNSPECIFIED (H): ICD-10-CM

## 2023-11-22 LAB
ERYTHROCYTE [DISTWIDTH] IN BLOOD BY AUTOMATED COUNT: 13.2 % (ref 10–15)
HCT VFR BLD AUTO: 33.4 % (ref 35–47)
HGB BLD-MCNC: 11.6 G/DL (ref 11.7–15.7)
MCH RBC QN AUTO: 30.4 PG (ref 26.5–33)
MCHC RBC AUTO-ENTMCNC: 34.7 G/DL (ref 31.5–36.5)
MCV RBC AUTO: 88 FL (ref 78–100)
PLATELET # BLD AUTO: 78 10E3/UL (ref 150–450)
RBC # BLD AUTO: 3.81 10E6/UL (ref 3.8–5.2)
WBC # BLD AUTO: 8.3 10E3/UL (ref 4–11)

## 2023-11-22 PROCEDURE — 85027 COMPLETE CBC AUTOMATED: CPT | Mod: ORL | Performed by: OBSTETRICS & GYNECOLOGY

## 2023-12-07 ENCOUNTER — LAB REQUISITION (OUTPATIENT)
Dept: LAB | Facility: CLINIC | Age: 33
End: 2023-12-07
Payer: COMMERCIAL

## 2023-12-07 DIAGNOSIS — Z34.91 ENCOUNTER FOR SUPERVISION OF NORMAL PREGNANCY, UNSPECIFIED, FIRST TRIMESTER: ICD-10-CM

## 2023-12-07 PROCEDURE — 82105 ALPHA-FETOPROTEIN SERUM: CPT | Mod: ORL | Performed by: OBSTETRICS & GYNECOLOGY

## 2023-12-11 LAB
# FETUSES US: NORMAL
AFP MOM SERPL: 0.78
AFP SERPL-MCNC: 27 NG/ML
AGE - REPORTED: 34.2 YR
CURRENT SMOKER: NO
FAMILY MEMBER DISEASES HX: NO
GA METHOD: NORMAL
GA: NORMAL WK
IDDM PATIENT QL: NO
INTEGRATED SCN PATIENT-IMP: NORMAL
SPECIMEN DRAWN SERPL: NORMAL

## 2024-01-05 ENCOUNTER — LAB REQUISITION (OUTPATIENT)
Dept: LAB | Facility: CLINIC | Age: 34
End: 2024-01-05
Payer: COMMERCIAL

## 2024-01-05 DIAGNOSIS — D69.6 THROMBOCYTOPENIA, UNSPECIFIED (H): ICD-10-CM

## 2024-01-05 LAB
ERYTHROCYTE [DISTWIDTH] IN BLOOD BY AUTOMATED COUNT: 14.1 % (ref 10–15)
HCT VFR BLD AUTO: 32.3 % (ref 35–47)
HGB BLD-MCNC: 11 G/DL (ref 11.7–15.7)
MCH RBC QN AUTO: 30.6 PG (ref 26.5–33)
MCHC RBC AUTO-ENTMCNC: 34.1 G/DL (ref 31.5–36.5)
MCV RBC AUTO: 90 FL (ref 78–100)
PLATELET # BLD AUTO: 111 10E3/UL (ref 150–450)
RBC # BLD AUTO: 3.59 10E6/UL (ref 3.8–5.2)
WBC # BLD AUTO: 9.1 10E3/UL (ref 4–11)

## 2024-01-05 PROCEDURE — 85027 COMPLETE CBC AUTOMATED: CPT | Mod: ORL | Performed by: OBSTETRICS & GYNECOLOGY

## 2024-02-02 ENCOUNTER — LAB REQUISITION (OUTPATIENT)
Dept: LAB | Facility: CLINIC | Age: 34
End: 2024-02-02
Payer: COMMERCIAL

## 2024-02-02 DIAGNOSIS — D69.6 THROMBOCYTOPENIA, UNSPECIFIED (H): ICD-10-CM

## 2024-02-02 DIAGNOSIS — Z3A.24 24 WEEKS GESTATION OF PREGNANCY: ICD-10-CM

## 2024-02-02 LAB
ERYTHROCYTE [DISTWIDTH] IN BLOOD BY AUTOMATED COUNT: 14.1 % (ref 10–15)
HCT VFR BLD AUTO: 35.2 % (ref 35–47)
HGB BLD-MCNC: 11.9 G/DL (ref 11.7–15.7)
MCH RBC QN AUTO: 30.7 PG (ref 26.5–33)
MCHC RBC AUTO-ENTMCNC: 33.8 G/DL (ref 31.5–36.5)
MCV RBC AUTO: 91 FL (ref 78–100)
PLATELET # BLD AUTO: 109 10E3/UL (ref 150–450)
RBC # BLD AUTO: 3.87 10E6/UL (ref 3.8–5.2)
WBC # BLD AUTO: 8.7 10E3/UL (ref 4–11)

## 2024-02-02 PROCEDURE — 85027 COMPLETE CBC AUTOMATED: CPT | Mod: ORL | Performed by: OBSTETRICS & GYNECOLOGY

## 2024-02-08 NOTE — DISCHARGE INSTRUCTIONS
Patient to [facility] 1 time a day due to chronic urinary retention.   Please make an appointment to follow up with Orthopedics (phone: [phone]) as soon as possible unless symptoms completely resolve.

## 2024-02-24 ENCOUNTER — HEALTH MAINTENANCE LETTER (OUTPATIENT)
Age: 34
End: 2024-02-24

## 2024-03-19 ENCOUNTER — HOSPITAL ENCOUNTER (OUTPATIENT)
Facility: CLINIC | Age: 34
Discharge: HOME | End: 2024-03-19
Admitting: OBSTETRICS & GYNECOLOGY
Payer: COMMERCIAL

## 2024-03-19 ENCOUNTER — LAB REQUISITION (OUTPATIENT)
Dept: LAB | Facility: CLINIC | Age: 34
End: 2024-03-19
Payer: COMMERCIAL

## 2024-03-19 DIAGNOSIS — Z3A.28 28 WEEKS GESTATION OF PREGNANCY: ICD-10-CM

## 2024-03-19 DIAGNOSIS — D69.6 THROMBOCYTOPENIA, UNSPECIFIED (H): ICD-10-CM

## 2024-03-19 LAB — PLATELET # BLD AUTO: 88 10E3/UL (ref 150–450)

## 2024-03-19 PROCEDURE — 85049 AUTOMATED PLATELET COUNT: CPT | Performed by: OBSTETRICS & GYNECOLOGY

## 2024-04-04 ENCOUNTER — LAB REQUISITION (OUTPATIENT)
Dept: LAB | Facility: CLINIC | Age: 34
End: 2024-04-04
Payer: COMMERCIAL

## 2024-04-04 DIAGNOSIS — Z36.9 ENCOUNTER FOR ANTENATAL SCREENING, UNSPECIFIED: ICD-10-CM

## 2024-04-04 DIAGNOSIS — D69.6 THROMBOCYTOPENIA, UNSPECIFIED (H): ICD-10-CM

## 2024-04-04 LAB
HBA1C MFR BLD: 5 %
PLATELET # BLD AUTO: 86 10E3/UL (ref 150–450)

## 2024-04-04 PROCEDURE — 85049 AUTOMATED PLATELET COUNT: CPT | Mod: ORL | Performed by: NURSE PRACTITIONER

## 2024-04-04 PROCEDURE — 83036 HEMOGLOBIN GLYCOSYLATED A1C: CPT | Mod: ORL | Performed by: NURSE PRACTITIONER

## 2024-04-26 ENCOUNTER — LAB REQUISITION (OUTPATIENT)
Dept: LAB | Facility: CLINIC | Age: 34
End: 2024-04-26
Payer: COMMERCIAL

## 2024-04-26 DIAGNOSIS — D69.6 THROMBOCYTOPENIA, UNSPECIFIED (H): ICD-10-CM

## 2024-04-26 DIAGNOSIS — Z36.85 ENCOUNTER FOR ANTENATAL SCREENING FOR STREPTOCOCCUS B: ICD-10-CM

## 2024-04-26 DIAGNOSIS — Z36.89 ENCOUNTER FOR OTHER SPECIFIED ANTENATAL SCREENING: ICD-10-CM

## 2024-04-26 DIAGNOSIS — Z3A.28 28 WEEKS GESTATION OF PREGNANCY: ICD-10-CM

## 2024-04-26 LAB
ERYTHROCYTE [DISTWIDTH] IN BLOOD BY AUTOMATED COUNT: 13 % (ref 10–15)
HCT VFR BLD AUTO: 34.6 % (ref 35–47)
HGB BLD-MCNC: 11.5 G/DL (ref 11.7–15.7)
MCH RBC QN AUTO: 30.8 PG (ref 26.5–33)
MCHC RBC AUTO-ENTMCNC: 33.2 G/DL (ref 31.5–36.5)
MCV RBC AUTO: 93 FL (ref 78–100)
PLATELET # BLD AUTO: 61 10E3/UL (ref 150–450)
RBC # BLD AUTO: 3.73 10E6/UL (ref 3.8–5.2)
WBC # BLD AUTO: 7 10E3/UL (ref 4–11)

## 2024-04-26 PROCEDURE — 87653 STREP B DNA AMP PROBE: CPT | Mod: ORL | Performed by: OBSTETRICS & GYNECOLOGY

## 2024-04-26 PROCEDURE — 85027 COMPLETE CBC AUTOMATED: CPT | Mod: ORL | Performed by: OBSTETRICS & GYNECOLOGY

## 2024-04-27 LAB — GP B STREP DNA SPEC QL NAA+PROBE: POSITIVE

## 2024-05-02 ENCOUNTER — LAB REQUISITION (OUTPATIENT)
Dept: LAB | Facility: CLINIC | Age: 34
End: 2024-05-02
Payer: COMMERCIAL

## 2024-05-02 DIAGNOSIS — Z86.2 PERSONAL HISTORY OF DISEASES OF THE BLOOD AND BLOOD-FORMING ORGANS AND CERTAIN DISORDERS INVOLVING THE IMMUNE MECHANISM: ICD-10-CM

## 2024-05-02 LAB
ERYTHROCYTE [DISTWIDTH] IN BLOOD BY AUTOMATED COUNT: 12.8 % (ref 10–15)
HCT VFR BLD AUTO: 34.3 % (ref 35–47)
HGB BLD-MCNC: 11.8 G/DL (ref 11.7–15.7)
MCH RBC QN AUTO: 31.6 PG (ref 26.5–33)
MCHC RBC AUTO-ENTMCNC: 34.4 G/DL (ref 31.5–36.5)
MCV RBC AUTO: 92 FL (ref 78–100)
PLATELET # BLD AUTO: 57 10E3/UL (ref 150–450)
RBC # BLD AUTO: 3.73 10E6/UL (ref 3.8–5.2)
WBC # BLD AUTO: 5.7 10E3/UL (ref 4–11)

## 2024-05-02 PROCEDURE — 85027 COMPLETE CBC AUTOMATED: CPT | Mod: ORL

## 2024-06-25 ENCOUNTER — LAB REQUISITION (OUTPATIENT)
Dept: LAB | Facility: CLINIC | Age: 34
End: 2024-06-25

## 2024-06-25 DIAGNOSIS — Z12.4 ENCOUNTER FOR SCREENING FOR MALIGNANT NEOPLASM OF CERVIX: ICD-10-CM

## 2024-06-25 PROCEDURE — 87624 HPV HI-RISK TYP POOLED RSLT: CPT | Performed by: OBSTETRICS & GYNECOLOGY

## 2024-06-25 PROCEDURE — G0145 SCR C/V CYTO,THINLAYER,RESCR: HCPCS | Performed by: OBSTETRICS & GYNECOLOGY

## 2024-06-26 LAB
HPV HR 12 DNA CVX QL NAA+PROBE: NEGATIVE
HPV16 DNA CVX QL NAA+PROBE: NEGATIVE
HPV18 DNA CVX QL NAA+PROBE: NEGATIVE
HUMAN PAPILLOMA VIRUS FINAL DIAGNOSIS: NORMAL

## 2024-07-01 LAB
BKR LAB AP GYN ADEQUACY: NORMAL
BKR LAB AP GYN INTERPRETATION: NORMAL
PATH REPORT.COMMENTS IMP SPEC: NORMAL
PATH REPORT.COMMENTS IMP SPEC: NORMAL

## 2024-10-08 ENCOUNTER — LAB REQUISITION (OUTPATIENT)
Dept: LAB | Facility: CLINIC | Age: 34
End: 2024-10-08
Payer: COMMERCIAL

## 2024-10-08 DIAGNOSIS — Z12.4 ENCOUNTER FOR SCREENING FOR MALIGNANT NEOPLASM OF CERVIX: ICD-10-CM

## 2024-10-08 DIAGNOSIS — Z3A.28 28 WEEKS GESTATION OF PREGNANCY: ICD-10-CM

## 2024-10-08 DIAGNOSIS — Z13.1 ENCOUNTER FOR SCREENING FOR DIABETES MELLITUS: ICD-10-CM

## 2024-10-08 DIAGNOSIS — D69.6 THROMBOCYTOPENIA, UNSPECIFIED (H): ICD-10-CM

## 2024-10-08 DIAGNOSIS — Z13.29 ENCOUNTER FOR SCREENING FOR OTHER SUSPECTED ENDOCRINE DISORDER: ICD-10-CM

## 2024-10-08 DIAGNOSIS — Z13.220 ENCOUNTER FOR SCREENING FOR LIPOID DISORDERS: ICD-10-CM

## 2024-10-08 LAB
ERYTHROCYTE [DISTWIDTH] IN BLOOD BY AUTOMATED COUNT: 13.2 % (ref 10–15)
EST. AVERAGE GLUCOSE BLD GHB EST-MCNC: 108 MG/DL
HBA1C MFR BLD: 5.4 %
HCT VFR BLD AUTO: 40.8 % (ref 35–47)
HGB BLD-MCNC: 13.4 G/DL (ref 11.7–15.7)
MCH RBC QN AUTO: 27.6 PG (ref 26.5–33)
MCHC RBC AUTO-ENTMCNC: 32.8 G/DL (ref 31.5–36.5)
MCV RBC AUTO: 84 FL (ref 78–100)
PLATELET # BLD AUTO: 109 10E3/UL (ref 150–450)
RBC # BLD AUTO: 4.85 10E6/UL (ref 3.8–5.2)
WBC # BLD AUTO: 5.9 10E3/UL (ref 4–11)

## 2024-10-08 PROCEDURE — 80061 LIPID PANEL: CPT | Mod: ORL | Performed by: OBSTETRICS & GYNECOLOGY

## 2024-10-08 PROCEDURE — 85027 COMPLETE CBC AUTOMATED: CPT | Mod: ORL | Performed by: OBSTETRICS & GYNECOLOGY

## 2024-10-08 PROCEDURE — 84443 ASSAY THYROID STIM HORMONE: CPT | Mod: ORL | Performed by: OBSTETRICS & GYNECOLOGY

## 2024-10-08 PROCEDURE — 87624 HPV HI-RISK TYP POOLED RSLT: CPT | Mod: ORL | Performed by: OBSTETRICS & GYNECOLOGY

## 2024-10-08 PROCEDURE — 83036 HEMOGLOBIN GLYCOSYLATED A1C: CPT | Mod: ORL | Performed by: OBSTETRICS & GYNECOLOGY

## 2024-10-08 PROCEDURE — G0145 SCR C/V CYTO,THINLAYER,RESCR: HCPCS | Mod: ORL | Performed by: OBSTETRICS & GYNECOLOGY

## 2024-10-09 LAB
CHOLEST SERPL-MCNC: 246 MG/DL
FASTING STATUS PATIENT QL REPORTED: ABNORMAL
HDLC SERPL-MCNC: 88 MG/DL
HPV HR 12 DNA CVX QL NAA+PROBE: NEGATIVE
HPV16 DNA CVX QL NAA+PROBE: NEGATIVE
HPV18 DNA CVX QL NAA+PROBE: NEGATIVE
HUMAN PAPILLOMA VIRUS FINAL DIAGNOSIS: NORMAL
LDLC SERPL CALC-MCNC: 122 MG/DL
NONHDLC SERPL-MCNC: 158 MG/DL
TRIGL SERPL-MCNC: 179 MG/DL
TSH SERPL DL<=0.005 MIU/L-ACNC: 1.13 UIU/ML (ref 0.3–4.2)

## 2024-10-11 LAB
BKR AP ASSOCIATED HPV REPORT: NORMAL
BKR LAB AP GYN ADEQUACY: NORMAL
BKR LAB AP GYN INTERPRETATION: NORMAL
BKR LAB AP LMP: NORMAL
BKR LAB AP PREVIOUS ABNL DX: NORMAL
BKR LAB AP PREVIOUS ABNORMAL: NORMAL
PATH REPORT.COMMENTS IMP SPEC: NORMAL
PATH REPORT.COMMENTS IMP SPEC: NORMAL
PATH REPORT.RELEVANT HX SPEC: NORMAL

## 2024-11-27 ENCOUNTER — LAB REQUISITION (OUTPATIENT)
Dept: LAB | Facility: CLINIC | Age: 34
End: 2024-11-27
Payer: COMMERCIAL

## 2024-11-27 DIAGNOSIS — R35.0 FREQUENCY OF MICTURITION: ICD-10-CM

## 2024-11-27 PROCEDURE — 87086 URINE CULTURE/COLONY COUNT: CPT | Mod: ORL | Performed by: NURSE PRACTITIONER

## 2024-11-28 LAB — BACTERIA UR CULT: NO GROWTH

## 2025-03-09 ENCOUNTER — HEALTH MAINTENANCE LETTER (OUTPATIENT)
Age: 35
End: 2025-03-09

## (undated) DEVICE — NDL 22GA 1.5"

## (undated) DEVICE — ESU GROUND PAD ADULT W/CORD E7507

## (undated) DEVICE — PEN MARKING SKIN W/PAPER RULER 31145785

## (undated) DEVICE — PIN ARTHREX FLIPCUTTER II  SHORT 9MM AR-1204AS-90

## (undated) DEVICE — PACK ACL SUPPLEMENT STD

## (undated) DEVICE — SPONGE RAY-TEC 4X8" 7318

## (undated) DEVICE — SU VICRYL 0 CT-1 27" UND J260H

## (undated) DEVICE — DEVICE RETRIEVER HEWSON 71111579

## (undated) DEVICE — ESU ELEC VAPR PREMIER RF 90DEG 3.7MM 227204

## (undated) DEVICE — DRAPE TIBURON TOP SHEET 100X60" 29352

## (undated) DEVICE — STRAP KNEE/BODY 31143004

## (undated) DEVICE — SOL WATER IRRIG 1000ML BOTTLE 2F7114

## (undated) DEVICE — GLOVE PROTEXIS POWDER FREE 8.0 ORTHOPEDIC 2D73ET80

## (undated) DEVICE — DRSG STERI STRIP 1/2X4" R1547

## (undated) DEVICE — POSITIONER ARMBOARD FOAM 1PAIR LF FP-ARMB1

## (undated) DEVICE — GLOVE PROTEXIS BLUE W/NEU-THERA 8.0  2D73EB80

## (undated) DEVICE — BNDG ELASTIC 6" DBL LENGTH UNSTERILE 6611-16

## (undated) DEVICE — BLADE SHAVER ARTHRO 3.5MM FULL RADIUS C9248

## (undated) DEVICE — Device

## (undated) DEVICE — BNDG SPANDAGRIP SZ G LF BEIGE 4.5" SAG13145

## (undated) DEVICE — LINEN TOWEL PACK X5 5464

## (undated) DEVICE — SU MONOCRYL 3-0 PS-1 27" Y936H

## (undated) DEVICE — COVER CAMERA IN-LIGHT DISP LT-C02

## (undated) DEVICE — SU VICRYL 0 CT 36" J358H

## (undated) DEVICE — DRSG ADAPTIC 3X8" 6113

## (undated) DEVICE — PIN GUIDE ARTHREX 2.4MM W/EYE BEATH PIN AR-1297L

## (undated) DEVICE — SU VICRYL 2-0 CT-1 27" UND J259H

## (undated) DEVICE — SU ETHIBOND 1 CT-1 30" X425H

## (undated) DEVICE — SU FIBERWIRE #2 FIBERSTICK 50"  AR-7209

## (undated) DEVICE — PIN GUIDE ARTHREX 2.4MM DRILL  AR-1250L

## (undated) DEVICE — SU TIGERSTICK #2 TIGERWIRE 50" STIFF END 12" AR-7209T

## (undated) DEVICE — SU MONOCRYL 3-0 PS-2 18" UND Y497G

## (undated) DEVICE — SU FIBERWIRE 2 38"  AR-7200

## (undated) DEVICE — BLADE KNIFE SURG 10 371110

## (undated) DEVICE — SU VICRYL 0 CT-1 36" J946H

## (undated) DEVICE — TUBING ARTHRO CONMED/LINVATEC PUMP BLUE INFLOW 10K100

## (undated) DEVICE — PREP CHLORAPREP 26ML TINTED ORANGE  260815

## (undated) DEVICE — LINEN ORTHO PACK 5446

## (undated) DEVICE — SU ETHILON 3-0 PS-1 18" 1663H

## (undated) DEVICE — SOL NACL 0.9% IRRIG 3000ML BAG 2B7477

## (undated) DEVICE — SU LOOP #2 TIGERLOOP AR-7234T

## (undated) DEVICE — SUCTION MANIFOLD DORNOCH ULTRA CART UL-CL500

## (undated) DEVICE — ESU PENCIL W/SMOKE EVAC NEPTUNE STRYKER 0703-046-000

## (undated) RX ORDER — LIDOCAINE HYDROCHLORIDE 20 MG/ML
INJECTION, SOLUTION EPIDURAL; INFILTRATION; INTRACAUDAL; PERINEURAL
Status: DISPENSED
Start: 2020-01-28

## (undated) RX ORDER — ACETAMINOPHEN 325 MG/1
TABLET ORAL
Status: DISPENSED
Start: 2020-01-28

## (undated) RX ORDER — SODIUM CHLORIDE, SODIUM LACTATE, POTASSIUM CHLORIDE, CALCIUM CHLORIDE 600; 310; 30; 20 MG/100ML; MG/100ML; MG/100ML; MG/100ML
INJECTION, SOLUTION INTRAVENOUS
Status: DISPENSED
Start: 2020-01-28

## (undated) RX ORDER — HYDROMORPHONE HYDROCHLORIDE 1 MG/ML
INJECTION, SOLUTION INTRAMUSCULAR; INTRAVENOUS; SUBCUTANEOUS
Status: DISPENSED
Start: 2020-01-28

## (undated) RX ORDER — DEXAMETHASONE SODIUM PHOSPHATE 4 MG/ML
INJECTION, SOLUTION INTRA-ARTICULAR; INTRALESIONAL; INTRAMUSCULAR; INTRAVENOUS; SOFT TISSUE
Status: DISPENSED
Start: 2020-01-28

## (undated) RX ORDER — FENTANYL CITRATE 50 UG/ML
INJECTION, SOLUTION INTRAMUSCULAR; INTRAVENOUS
Status: DISPENSED
Start: 2020-01-28

## (undated) RX ORDER — PROPOFOL 10 MG/ML
INJECTION, EMULSION INTRAVENOUS
Status: DISPENSED
Start: 2020-01-28

## (undated) RX ORDER — GABAPENTIN 300 MG/1
CAPSULE ORAL
Status: DISPENSED
Start: 2020-01-28

## (undated) RX ORDER — ONDANSETRON 2 MG/ML
INJECTION INTRAMUSCULAR; INTRAVENOUS
Status: DISPENSED
Start: 2020-01-28

## (undated) RX ORDER — OXYCODONE HYDROCHLORIDE 5 MG/1
TABLET ORAL
Status: DISPENSED
Start: 2020-01-28

## (undated) RX ORDER — BUPIVACAINE HYDROCHLORIDE AND EPINEPHRINE 2.5; 5 MG/ML; UG/ML
INJECTION, SOLUTION EPIDURAL; INFILTRATION; INTRACAUDAL; PERINEURAL
Status: DISPENSED
Start: 2020-01-28